# Patient Record
Sex: FEMALE | Race: WHITE | NOT HISPANIC OR LATINO | Employment: OTHER | URBAN - METROPOLITAN AREA
[De-identification: names, ages, dates, MRNs, and addresses within clinical notes are randomized per-mention and may not be internally consistent; named-entity substitution may affect disease eponyms.]

---

## 2024-06-04 ENCOUNTER — OFFICE VISIT (OUTPATIENT)
Dept: CARDIOLOGY | Facility: CLINIC | Age: 74
End: 2024-06-04
Payer: MEDICARE

## 2024-06-04 VITALS — DIASTOLIC BLOOD PRESSURE: 76 MMHG | BODY MASS INDEX: 39.11 KG/M2 | WEIGHT: 207 LBS | SYSTOLIC BLOOD PRESSURE: 142 MMHG

## 2024-06-04 DIAGNOSIS — I10 ESSENTIAL HYPERTENSION: ICD-10-CM

## 2024-06-04 DIAGNOSIS — Z71.89 CARDIAC RISK COUNSELING: Primary | ICD-10-CM

## 2024-06-04 DIAGNOSIS — Z01.810 PREOPERATIVE CARDIOVASCULAR EXAMINATION: ICD-10-CM

## 2024-06-04 PROCEDURE — 99214 OFFICE O/P EST MOD 30 MIN: CPT | Performed by: INTERNAL MEDICINE

## 2024-06-04 PROCEDURE — 1125F AMNT PAIN NOTED PAIN PRSNT: CPT | Performed by: INTERNAL MEDICINE

## 2024-06-04 PROCEDURE — 1036F TOBACCO NON-USER: CPT | Performed by: INTERNAL MEDICINE

## 2024-06-04 PROCEDURE — 1157F ADVNC CARE PLAN IN RCRD: CPT | Performed by: INTERNAL MEDICINE

## 2024-06-04 PROCEDURE — 3078F DIAST BP <80 MM HG: CPT | Performed by: INTERNAL MEDICINE

## 2024-06-04 PROCEDURE — 3077F SYST BP >= 140 MM HG: CPT | Performed by: INTERNAL MEDICINE

## 2024-06-04 PROCEDURE — 1159F MED LIST DOCD IN RCRD: CPT | Performed by: INTERNAL MEDICINE

## 2024-06-04 RX ORDER — ALBUTEROL SULFATE 90 UG/1
2 AEROSOL, METERED RESPIRATORY (INHALATION) EVERY 4 HOURS PRN
COMMUNITY

## 2024-06-04 RX ORDER — AMLODIPINE BESYLATE 2.5 MG/1
2.5 TABLET ORAL DAILY
Qty: 30 TABLET | Refills: 11 | Status: SHIPPED | OUTPATIENT
Start: 2024-06-04 | End: 2025-06-04

## 2024-06-04 RX ORDER — ATORVASTATIN CALCIUM 20 MG/1
20 TABLET, FILM COATED ORAL DAILY
COMMUNITY
Start: 2024-02-23 | End: 2025-02-22

## 2024-06-04 ASSESSMENT — COLUMBIA-SUICIDE SEVERITY RATING SCALE - C-SSRS
1. IN THE PAST MONTH, HAVE YOU WISHED YOU WERE DEAD OR WISHED YOU COULD GO TO SLEEP AND NOT WAKE UP?: NO
6. HAVE YOU EVER DONE ANYTHING, STARTED TO DO ANYTHING, OR PREPARED TO DO ANYTHING TO END YOUR LIFE?: NO
2. HAVE YOU ACTUALLY HAD ANY THOUGHTS OF KILLING YOURSELF?: NO

## 2024-06-04 ASSESSMENT — ENCOUNTER SYMPTOMS
DEPRESSION: 0
OCCASIONAL FEELINGS OF UNSTEADINESS: 0
LOSS OF SENSATION IN FEET: 0

## 2024-06-04 ASSESSMENT — PATIENT HEALTH QUESTIONNAIRE - PHQ9
1. LITTLE INTEREST OR PLEASURE IN DOING THINGS: NOT AT ALL
SUM OF ALL RESPONSES TO PHQ9 QUESTIONS 1 AND 2: 0
2. FEELING DOWN, DEPRESSED OR HOPELESS: NOT AT ALL

## 2024-06-04 ASSESSMENT — PAIN SCALES - GENERAL: PAINLEVEL: 4

## 2024-06-04 NOTE — PROGRESS NOTES
Not applicable Subjective      Chief Complaint   Patient presents with    yearly follow up        Here for her annual visit to assess cardiac risk management, and she remains angina free with a stable functional.    Previous: She had nuclear stress testing in July 2022 nonischemic study with hyperdynamic LV systolic function and left ventricular ejection fraction 80%.         Review of Systems   All other systems reviewed and are negative.       Objective   Physical Exam  Constitutional:       Appearance: Normal appearance.   HENT:      Head: Normocephalic and atraumatic.   Eyes:      Pupils: Pupils are equal, round, and reactive to light.   Cardiovascular:      Rate and Rhythm: Normal rate and regular rhythm.      Pulses: Normal pulses.      Heart sounds: Normal heart sounds.   Pulmonary:      Effort: Pulmonary effort is normal.      Breath sounds: Normal breath sounds.   Abdominal:      General: Abdomen is flat. Bowel sounds are normal.      Palpations: Abdomen is soft.   Musculoskeletal:         General: Normal range of motion.      Cervical back: Normal range of motion.   Skin:     General: Skin is warm and dry.   Neurological:      General: No focal deficit present.   Psychiatric:         Mood and Affect: Mood normal.         Judgment: Judgment normal.          Lab Review:   Not applicable    Cardiac risk counseling  We talked about the value of daily walking/exercise as well as a commitment to low carbohydrate and low sugar [place]/Mediterranean dietary lifestyle for cardiac risk reduction and weight and hypertension management.    She can follow-up with me on an annual basis with lipid and metabolic profiles primary care physician.

## 2024-06-04 NOTE — ASSESSMENT & PLAN NOTE
We talked about the value of daily walking/exercise as well as a commitment to low carbohydrate and low sugar [place]/Mediterranean dietary lifestyle for cardiac risk reduction and weight and hypertension management.    She can proceed with her knee replacement surgery with a low cardiovascular risk based on her history of nonischemic nuclear stress test, and stable cardiac risk factors and follow-up with me in 1 year.    She continues on a moderate dose statin therapy and can follow-up with me in 1 year.    She can follow-up with me on an annual basis with lipid and metabolic profiles primary care physician.

## 2024-06-10 ENCOUNTER — PRE-ADMISSION TESTING (OUTPATIENT)
Dept: PREADMISSION TESTING | Facility: HOSPITAL | Age: 74
End: 2024-06-10
Payer: MEDICARE

## 2024-06-10 ENCOUNTER — LAB (OUTPATIENT)
Dept: LAB | Facility: LAB | Age: 74
End: 2024-06-10
Payer: MEDICARE

## 2024-06-10 VITALS
BODY MASS INDEX: 41 KG/M2 | WEIGHT: 217.15 LBS | HEART RATE: 76 BPM | TEMPERATURE: 97.7 F | DIASTOLIC BLOOD PRESSURE: 95 MMHG | SYSTOLIC BLOOD PRESSURE: 150 MMHG | OXYGEN SATURATION: 100 % | HEIGHT: 61 IN | RESPIRATION RATE: 18 BRPM

## 2024-06-10 DIAGNOSIS — Z01.818 PREOPERATIVE TESTING: Primary | ICD-10-CM

## 2024-06-10 DIAGNOSIS — Z01.818 PREOPERATIVE TESTING: ICD-10-CM

## 2024-06-10 LAB
ALBUMIN SERPL-MCNC: 4.2 G/DL (ref 3.5–5)
ALP BLD-CCNC: 139 U/L (ref 35–125)
ALT SERPL-CCNC: 19 U/L (ref 5–40)
ANION GAP SERPL CALC-SCNC: 18 MMOL/L
APPEARANCE UR: CLEAR
AST SERPL-CCNC: 25 U/L (ref 5–40)
ATRIAL RATE: 72 BPM
BILIRUB SERPL-MCNC: 0.3 MG/DL (ref 0.1–1.2)
BILIRUB UR STRIP.AUTO-MCNC: NEGATIVE MG/DL
BUN SERPL-MCNC: 25 MG/DL (ref 8–25)
CALCIUM SERPL-MCNC: 10 MG/DL (ref 8.5–10.4)
CHLORIDE SERPL-SCNC: 97 MMOL/L (ref 97–107)
CO2 SERPL-SCNC: 21 MMOL/L (ref 24–31)
COLOR UR: NORMAL
CREAT SERPL-MCNC: 1 MG/DL (ref 0.4–1.6)
EGFRCR SERPLBLD CKD-EPI 2021: 60 ML/MIN/1.73M*2
ERYTHROCYTE [DISTWIDTH] IN BLOOD BY AUTOMATED COUNT: 11.6 % (ref 11.5–14.5)
GLUCOSE SERPL-MCNC: 89 MG/DL (ref 65–99)
GLUCOSE UR STRIP.AUTO-MCNC: NORMAL MG/DL
HCT VFR BLD AUTO: 42.6 % (ref 36–46)
HGB BLD-MCNC: 13.6 G/DL (ref 12–16)
HOLD SPECIMEN: NORMAL
KETONES UR STRIP.AUTO-MCNC: NEGATIVE MG/DL
LEUKOCYTE ESTERASE UR QL STRIP.AUTO: NEGATIVE
MCH RBC QN AUTO: 33.4 PG (ref 26–34)
MCHC RBC AUTO-ENTMCNC: 31.9 G/DL (ref 32–36)
MCV RBC AUTO: 105 FL (ref 80–100)
NITRITE UR QL STRIP.AUTO: NEGATIVE
NRBC BLD-RTO: 0 /100 WBCS (ref 0–0)
P AXIS: 86 DEGREES
P OFFSET: 180 MS
P ONSET: 123 MS
PH UR STRIP.AUTO: 7.5 [PH]
PLATELET # BLD AUTO: 309 X10*3/UL (ref 150–450)
POTASSIUM SERPL-SCNC: 4.3 MMOL/L (ref 3.4–5.1)
PR INTERVAL: 182 MS
PROT SERPL-MCNC: 7.7 G/DL (ref 5.9–7.9)
PROT UR STRIP.AUTO-MCNC: NEGATIVE MG/DL
Q ONSET: 214 MS
QRS COUNT: 12 BEATS
QRS DURATION: 86 MS
QT INTERVAL: 400 MS
QTC CALCULATION(BAZETT): 438 MS
QTC FREDERICIA: 425 MS
R AXIS: -81 DEGREES
RBC # BLD AUTO: 4.07 X10*6/UL (ref 4–5.2)
RBC # UR STRIP.AUTO: NEGATIVE /UL
SODIUM SERPL-SCNC: 136 MMOL/L (ref 133–145)
SP GR UR STRIP.AUTO: 1.01
T AXIS: 70 DEGREES
T OFFSET: 414 MS
UROBILINOGEN UR STRIP.AUTO-MCNC: NORMAL MG/DL
VENTRICULAR RATE: 72 BPM
WBC # BLD AUTO: 6.7 X10*3/UL (ref 4.4–11.3)

## 2024-06-10 PROCEDURE — 36415 COLL VENOUS BLD VENIPUNCTURE: CPT

## 2024-06-10 PROCEDURE — 93010 ELECTROCARDIOGRAM REPORT: CPT | Performed by: INTERNAL MEDICINE

## 2024-06-10 PROCEDURE — 81003 URINALYSIS AUTO W/O SCOPE: CPT

## 2024-06-10 PROCEDURE — 85027 COMPLETE CBC AUTOMATED: CPT

## 2024-06-10 PROCEDURE — 93005 ELECTROCARDIOGRAM TRACING: CPT

## 2024-06-10 PROCEDURE — 80053 COMPREHEN METABOLIC PANEL: CPT

## 2024-06-10 PROCEDURE — 87081 CULTURE SCREEN ONLY: CPT | Mod: WESLAB

## 2024-06-10 RX ORDER — TRIAMTERENE AND HYDROCHLOROTHIAZIDE 75; 50 MG/1; MG/1
1 TABLET ORAL
COMMUNITY
Start: 2016-08-18 | End: 2025-02-22

## 2024-06-10 RX ORDER — CHLORHEXIDINE GLUCONATE ORAL RINSE 1.2 MG/ML
SOLUTION DENTAL
Qty: 473 ML | Refills: 0 | Status: SHIPPED | OUTPATIENT
Start: 2024-06-10

## 2024-06-10 RX ORDER — CARBAMAZEPINE 200 MG/1
200-400 TABLET ORAL 2 TIMES DAILY
COMMUNITY

## 2024-06-10 RX ORDER — BUTALBITAL, ACETAMINOPHEN AND CAFFEINE 300; 40; 50 MG/1; MG/1; MG/1
1 CAPSULE ORAL EVERY 4 HOURS PRN
COMMUNITY

## 2024-06-10 ASSESSMENT — DUKE ACTIVITY SCORE INDEX (DASI)
DASI METS SCORE: 8
CAN YOU DO MODERATE WORK AROUND THE HOUSE LIKE VACUUMING, SWEEPING FLOORS OR CARRYING GROCERIES: YES
CAN YOU PARTICIPATE IN STRENOUS SPORTS LIKE SWIMMING, SINGLES TENNIS, FOOTBALL, BASKETBALL, OR SKIING: NO
CAN YOU DO HEAVY WORK AROUND THE HOUSE LIKE SCRUBBING FLOORS OR LIFTING AND MOVING HEAVY FURNITURE: YES
TOTAL_SCORE: 42.7
CAN YOU DO LIGHT WORK AROUND THE HOUSE LIKE DUSTING OR WASHING DISHES: YES
CAN YOU RUN A SHORT DISTANCE: NO
CAN YOU PARTICIPATE IN MODERATE RECREATIONAL ACTIVITIES LIKE GOLF, BOWLING, DANCING, DOUBLES TENNIS OR THROWING A BASEBALL OR FOOTBALL: YES
CAN YOU TAKE CARE OF YOURSELF (EAT, DRESS, BATHE, OR USE TOILET): YES
CAN YOU CLIMB A FLIGHT OF STAIRS OR WALK UP A HILL: YES
CAN YOU HAVE SEXUAL RELATIONS: YES
CAN YOU DO YARD WORK LIKE RAKING LEAVES, WEEDING OR PUSHING A MOWER: YES
CAN YOU WALK INDOORS, SUCH AS AROUND YOUR HOUSE: YES
CAN YOU WALK A BLOCK OR TWO ON LEVEL GROUND: YES

## 2024-06-10 ASSESSMENT — PAIN SCALES - GENERAL: PAINLEVEL_OUTOF10: 5 - MODERATE PAIN

## 2024-06-10 ASSESSMENT — PAIN - FUNCTIONAL ASSESSMENT: PAIN_FUNCTIONAL_ASSESSMENT: 0-10

## 2024-06-10 ASSESSMENT — ENCOUNTER SYMPTOMS
CARDIOVASCULAR NEGATIVE: 1
EYES NEGATIVE: 1
PSYCHIATRIC NEGATIVE: 1
ARTHRALGIAS: 1
GASTROINTESTINAL NEGATIVE: 1
COUGH: 1
ACTIVITY CHANGE: 1

## 2024-06-10 ASSESSMENT — PAIN DESCRIPTION - DESCRIPTORS: DESCRIPTORS: ACHING

## 2024-06-10 NOTE — H&P (VIEW-ONLY)
"CPM/PAT Evaluation       Name: Peggy Menjivar (Peggy Menjivar)  /Age: 1950/73 y.o.     In-Person       Chief Complaint: Primary osteoarthritis of left knee    HPI  A 73-year-old female with left knee osteoarthritis. History of progressive left knee pain for \"years and years\".  Symptoms increase with prolonged ambulation, transitioning from sitting to standing and at bedtime interfering with sleep and quality of life.  Conservative treatments/injections no longer helping.  She underwent successful right total knee arthroplasty 2023. Denies fever, chills, chest pain, shortness of breath, syncope, or left lower extremity numbness.  She is scheduled for left total knee arthroplasty resurfacing.    Past Medical History:   Diagnosis Date    Adverse effect of anesthesia     \"Chills\" and \"I have alot of mucus\"    Breast cancer (Multi)     left  breast    HLD (hyperlipidemia)     HTN (hypertension)     Osteoarthritis     Seizure (Multi)     last     Spinal stenosis        Past Surgical History:   Procedure Laterality Date    BREAST SURGERY Left     breast cancer,  lumpectomy/mastectomy/reconstruction    CHOLECYSTECTOMY      COLONOSCOPY      HYSTERECTOMY      TOTAL KNEE ARTHROPLASTY Right 2023         No Known Allergies    Current Outpatient Medications   Medication Sig Dispense Refill    acetaminophen (Tylenol) 500 mg tablet TAKE 1 TABLET BY MOUTH EVERY 4 HOURS 180 tablet 0    albuterol 90 mcg/actuation inhaler Inhale 2 puffs every 4 hours if needed for wheezing.      amLODIPine (Norvasc) 2.5 mg tablet Take 1 tablet (2.5 mg) by mouth once daily. (Patient taking differently: Take 1 tablet (2.5 mg) by mouth if needed (IF BP >140).) 30 tablet 11    atorvastatin (Lipitor) 20 mg tablet Take 1 tablet (20 mg) by mouth once daily.      butalbital-acetaminophen-caff (Fioricet) -40 mg capsule Take 1 capsule by mouth every 4 hours if needed for headaches.      carBAMazepine (TEGretol) 200 mg tablet " Take 1-2 tablets (200-400 mg) by mouth 2 times a day. Take 2 tablets in the morning and 1 tablet in the evening.      MELOXICAM ORAL Take 15 mg by mouth once daily.      triamterene-hydrochlorothiazid (Maxzide) 75-50 mg tablet Take 1 tablet by mouth once daily.      chlorhexidine (Peridex) 0.12 % solution Use cap to measure 15 mL.  Swish/gargle mouthwash for at least 30 seconds.  Do not swallow.  Use night before surgery after brushing teeth and morning of surgery after brushing teeth. 473 mL 0     No current facility-administered medications for this visit.     Review of Systems   Constitutional:  Positive for activity change.   HENT:  Positive for postnasal drip (Chronic).    Eyes: Negative.         Contacts/glasses   Respiratory:  Positive for cough (Chronic).    Cardiovascular: Negative.    Gastrointestinal: Negative.    Genitourinary: Negative.    Musculoskeletal:  Positive for arthralgias and gait problem.        Left knee pain   Skin: Negative.    Psychiatric/Behavioral: Negative.          Physical Exam  Vitals (Elevated BP-patient advised to monitor BP, take medications as prescribed and follow-up with PCP) reviewed.   Constitutional:       Appearance: She is obese.   HENT:      Head: Normocephalic and atraumatic.      Mouth/Throat:      Mouth: Mucous membranes are moist.   Eyes:      Pupils: Pupils are equal, round, and reactive to light.      Comments: Contact lenses   Cardiovascular:      Rate and Rhythm: Normal rate and regular rhythm.   Pulmonary:      Effort: Pulmonary effort is normal.      Breath sounds: Normal breath sounds.   Abdominal:      Palpations: Abdomen is soft.   Musculoskeletal:      Cervical back: Normal range of motion.      Comments: Left knee pain, antalgic gait   Skin:     General: Skin is warm and dry.   Neurological:      Mental Status: She is oriented to person, place, and time.   Psychiatric:         Mood and Affect: Mood normal.          PAT AIRWAY:   Airway:     Mallampati::   "II    Neck ROM::  Full  normal        BP (!) 150/95   Pulse 76   Temp 36.5 °C (97.7 °F) (Tympanic)   Resp 18   Ht 1.549 m (5' 1\")   Wt 98.5 kg (217 lb 2.5 oz)   SpO2 100%   BMI 41.03 kg/m²       Stop Bang Score 4   CHADS 2 score: 2.8%  DASI score: 42.7  METS score: 8  Revised cardiac risk index: 0.9%  ASA III  ARISCAT 1.6%  Lab orders CBC, CMP, UA, MRSA, EKG  Cardiac stress test 7/25/2022 WNL, echocardiogram LVEF 80%  EKG at PAT normal sinus rhythm, left axis deviation, pulmonary disease pattern preliminary awaiting confirmation by cardiologist  Assessment and Plan:     Primary osteoarthritis of left knee Plan: Left total knee arthroplasty resurfacing.  Hypertension-follows yearly with Dr. Fields last visit 6/4/2024  Hyperlipidemia  History of seizure last 1985  History of left breast cancer status post left breast surgery/mastectomy/chemo and reconstruction  Migraine  Osteoarthritis/spinal stenosis  BMI 41.03          "

## 2024-06-10 NOTE — PREPROCEDURE INSTRUCTIONS
Medication List            Accurate as of Cass 10, 2024 10:28 AM. Always use your most recent med list.                acetaminophen 500 mg tablet  Commonly known as: Tylenol  TAKE 1 TABLET BY MOUTH EVERY 4 HOURS  Medication Adjustments for Surgery: Other (Comment)  Notes to patient: CAN TAKE THE MORNING OF SURGERY IF NEEDED     albuterol 90 mcg/actuation inhaler  Medication Adjustments for Surgery: Other (Comment)  Notes to patient: CAN USE THE MORNING OF SURGERY IF NEEDED - BRING WITH YOU TO SURGERY     amLODIPine 2.5 mg tablet  Commonly known as: Norvasc  Take 1 tablet (2.5 mg) by mouth as needed if BP >140  Medication Adjustments for Surgery: Take morning of surgery with sip of water, no other fluids     atorvastatin 20 mg tablet  Commonly known as: Lipitor  Medication Adjustments for Surgery: Other (Comment)  Notes to patient: CAN TAKE THE NIGHT BEFORE SURGERY     carBAMazepine 200 mg tablet  Commonly known as: TEGretol  Medication Adjustments for Surgery: Take morning of surgery with sip of water, no other fluids     chlorhexidine 0.12 % solution  Commonly known as: Peridex  Use cap to measure 15 mL.  Swish/gargle mouthwash for at least 30 seconds.  Do not swallow.  Use night before surgery after brushing teeth and morning of surgery after brushing teeth.  Medication Adjustments for Surgery: Other (Comment)  Notes to patient: USE THE NIGHT BEFORE SURGERY AND THE MORNING OF SURGERY     Fioricet -40 mg capsule  Generic drug: butalbital-acetaminophen-caff  Medication Adjustments for Surgery: Other (Comment)  Notes to patient: CAN TAKE THE MORNING OF SURGERY IF NEEDED     MELOXICAM ORAL  Medication Adjustments for Surgery: Stop 7 days before surgery     triamterene-hydrochlorothiazid 75-50 mg tablet  Commonly known as: Maxzide  Medication Adjustments for Surgery: Take morning of surgery with sip of water, no other fluids                 Preoperative Fasting Guidelines    Why must I stop eating and  drinking near surgery time?  With sedation, food or liquid in your stomach can enter your lungs causing serious complications  Increases nausea and vomiting    When do I need to stop eating and drinking before my surgery?  Do not eat any food or drink any liquids after midnight the night before your surgery/procedure.  You may have sips of water to take medications.    PAT DISCHARGE INSTRUCTIONS    Please call the Same Day Surgery (SDS) Department of the hospital where your procedure will be performed after 2:00 PM the day before your surgery. If you are scheduled on a Monday, or a Tuesday following a Monday holiday, you will need to call on the last business day prior to your surgery.    [facility]  [address] [address], [address] [phone]  [facility]  [address] [place] [address] [phone]  [facility]  [address] [facility].  [place] [id number] [phone]    Please let your surgeon know if:      You develop any open sores, shingles, burning or painful urination as these may increase your risk of an infection.   You no longer wish to have the surgery.   Any other personal circumstances change that may lead to the need to cancel or defer this surgery-such as being sick or getting admitted to any hospital within one week of your planned procedure.    Your contact details change, such as a change of address or phone number.    Starting now:     Please DO NOT drink alcohol or smoke for 24 hours before surgery. It is well known that quitting smoking can make a huge difference to your health and recovery from surgery. The longer you abstain from smoking, the better your chances of a healthy recovery. If you need help with quitting, call 8-800-QUIT-NOW to be connected to a trained counselor who will discuss the best methods to help you quit.     Before your  surgery:    Please stop all supplements 7 days prior to surgery. Or as directed by your surgeon.   Please stop taking NSAID pain medicine such as Advil and Motrin 7 days before surgery.    If you develop any fever, cough, cold, rashes, cuts, scratches, scrapes, urinary symptoms or infection anywhere on your body (including teeth and gums) prior to surgery, please call your surgeon’s office as soon as possible. This may require treatment to reduce the chance of cancellation on the day of surgery.    The day before your surgery:   DIET- Please follow the diet instructions at the top of your packet.   Get a good night’s rest.  Use the special soap for bathing if you have been instructed to use one.    Scheduled surgery times may change and you will be notified if this occurs - please check your personal voicemail for any updates.     On the morning of surgery:   Wear comfortable, loose fitting clothes which open in the front. Please do not wear moisturizers, creams, lotions, makeup or perfume.    Please bring with you to surgery:   Photo ID and insurance card   Current list of medicines and allergies   Pacemaker/ Defibrillator/Heart stent cards   CPAP machine and mask    Slings/ splints/ crutches   A copy of your complete advanced directive/DHPOA.    Please do NOT bring with you to surgery:   All jewelry and valuables should be left at home.   Prosthetic devices such as contact lenses, hearing aids, dentures, eyelash extensions, hairpins and body piercings must be removed prior to going in to the surgical suite.    After outpatient surgery:   A responsible adult MUST accompany you at the time of discharge and stay with you for 24 hours after your surgery. You may NOT drive yourself home after surgery.    Do not drive, operate machinery, make critical decisions or do activities that require co-ordination or balance until after a night’s sleep.   Do not drink alcoholic beverages for 24 hours.   Instructions for resuming  your medications will be provided by your surgeon.    CALL YOUR DOCTOR AFTER SURGERY IF YOU HAVE:     Chills and/or a fever of 101° F or higher.    Redness, swelling, pus or drainage from your surgical wound or a bad smell from the wound.    Lightheadedness, fainting or confusion.    Persistent vomiting (throwing up) and are not able to eat or drink for 12 hours.    Three or more loose, watery bowel movements in 24 hours (diarrhea).   Difficulty or pain while urinating( after non-urological surgery)    Pain and swelling in your legs, especially if it is only on one side.    Difficulty breathing or are breathing faster than normal.    Any new concerning symptoms.      Patient Information: Pre-Operative Infection Prevention Measures     Why did I have my nose, under my arms, and groin swabbed?  The purpose of the swab is to identify Staphylococcus aureus inside your nose or on your skin.  The swab was sent to the laboratory for culture.  A positive swab/culture for Staphylococcus aureus is called colonization or carriage.      What is Staphylococcus aureus?  Staphylococcus aureus, also known as “staph”, is a germ found on the skin or in the nose of healthy people.  Sometimes Staphylococcus aureus can get into the body and cause an infection.  This can be minor (such as pimples, boils, or other skin problems).  It might also be serious (such as a blood infection, pneumonia, or a surgical site infection).    What is Staphylococcus aureus colonization or carriage?  Colonization or carriage means that a person has the germ but is not sick from it.  These bacteria can be spread on the hands or when breathing or sneezing.    How is Staphylococcus aureus spread?  It is most often spread by close contact with a person or item that carries it.    What happens if my culture is positive for Staphylococcus aureus?  Your doctor/medical team will use this information to guide any antibiotic treatment which may be necessary.   Regardless of the culture results, we will clean the inside of your nose with a betadine swab just before you have your surgery.      Will I get an infection if I have Staphylococcus aureus in my nose or on my skin?  Anyone can get an infection with Staphylococcus aureus.  However, the best way to reduce your risk of infection is to follow the instructions provided to you for the use of your CHG soap and dental rinse.        Patient Information: Oral/Dental Rinse    What is oral/dental rinse?   It is a mouthwash. It is a way of cleaning the mouth with a germ-killing solution before your surgery.  The solution contains chlorhexidine, commonly known as CHG.   It is used inside the mouth to kill a bacteria known as Staphylococcus aureus.  Let your doctor know if you are allergic to Chlorhexidine.    Why do I need to use CHG oral/dental rinse?  The CHG oral/dental rinse helps to kill a bacteria in your mouth known as Staphylococcus aureus.     This reduces the risk of infection at the surgical site.      Using your CHG oral/dental rinse  STEPS:  Use your CHG oral/dental rinse after you brush your teeth the night before (at bedtime) and the morning of your surgery.  Follow all directions on your prescription label.    Use the cap on the container to measure 15ml   Swish (gargle if you can) the mouthwash in your mouth for at least 30 seconds, (do not swallow) and spit out  After you use your CHG rinse, do not rinse your mouth with water, drink or eat.  Please refer to the prescription label for the appropriate time to resume oral intake      What side effects might I have using the CHG oral/dental rinse?  CHG rinse will stick to plaque on the teeth.  Brush and floss just before use.  Teeth brushing will help avoid staining of plaque during use.      Patient Information: Home Preoperative Antibacterial Shower      What is a home preoperative antibacterial shower?  This shower is a way of cleaning the skin with a  germ-killing solution before surgery.  The solution contains chlorhexidine, commonly known as CHG.  CHG is a skin cleanser with germ-killing ability.  Let your doctor know if you are allergic to chlorhexidine.    Why do I need to take a preoperative antibacterial shower?  Skin is not sterile.  It is best to try to make your skin as free of germs as possible before surgery.  Proper cleansing with a germ-killing soap before surgery can lower the number of germs on your skin.  This helps to reduce the risk of infection at the surgical site.  Following the instructions listed below will help you prepare your skin for surgery.      How do I use the solution?  Steps:  Begin using your CHG soap 5 days before your scheduled surgery on _____start wash 6/20/24________________.    First, wash and rinse your hair using the CHG soap. Keep CHG soap away from ear canals and eyes.  Rinse completely, do not condition.  Hair extensions should be removed.  Wash your face with your normal soap and rinse.    Apply the CHG solution to a clean wet washcloth.  Turn the water off or move away from the water spray to avoid premature rinsing of the CHG soap as you are applying.   Firmly lather your entire body from the neck down.  Do not use on your face.  Pay special attention to the area(s) where your incision(s) will be located unless they are on your face.  Avoid scrubbing your skin too hard.  The important point is to have the CHG soap sit on your skin for 3 minutes.    When the 3 minutes are up, turn on the water and rinse the CHG solution off your body completely.   DO NOT wash with regular soap after you have used the CHG soap solution  Pat yourself dry with a clean, freshly-laundered towel.  DO NOT apply powders, deodorants, or lotions.  Dress in clean, freshly laundered nightclothes.    Be sure to sleep with clean, freshly laundered sheets.  Be aware that CHG will cause stains on fabrics; if you wash them with bleach after use.   Rinse your washcloth and other linens that have contact with CHG completely.  Use only non-chlorine detergents to launder the items used.   The morning of surgery is the fifth day.  Repeat the above steps and dress in clean comfortable clothing     Whom should I contact if I have any questions regarding the use of CHG soap?  Call the [facility] at [phone] if you have any questions.

## 2024-06-12 LAB — STAPHYLOCOCCUS SPEC CULT: NORMAL

## 2024-06-24 ENCOUNTER — HOSPITAL ENCOUNTER (OUTPATIENT)
Facility: HOSPITAL | Age: 74
Discharge: HOME | End: 2024-06-26
Attending: ORTHOPAEDIC SURGERY | Admitting: ORTHOPAEDIC SURGERY
Payer: MEDICARE

## 2024-06-24 ENCOUNTER — ANESTHESIA EVENT (OUTPATIENT)
Dept: OPERATING ROOM | Facility: HOSPITAL | Age: 74
End: 2024-06-24
Payer: MEDICARE

## 2024-06-24 ENCOUNTER — ANESTHESIA (OUTPATIENT)
Dept: OPERATING ROOM | Facility: HOSPITAL | Age: 74
End: 2024-06-24
Payer: MEDICARE

## 2024-06-24 ENCOUNTER — APPOINTMENT (OUTPATIENT)
Dept: RADIOLOGY | Facility: HOSPITAL | Age: 74
End: 2024-06-24
Payer: MEDICARE

## 2024-06-24 DIAGNOSIS — M17.12 ARTHRITIS OF LEFT KNEE: Primary | ICD-10-CM

## 2024-06-24 PROBLEM — E66.813 CLASS 3 SEVERE OBESITY IN ADULT: Status: ACTIVE | Noted: 2024-06-24

## 2024-06-24 PROBLEM — R56.9 SEIZURES (MULTI): Status: ACTIVE | Noted: 2024-06-24

## 2024-06-24 PROBLEM — E66.01 CLASS 3 SEVERE OBESITY IN ADULT (MULTI): Status: ACTIVE | Noted: 2024-06-24

## 2024-06-24 PROBLEM — I10 HTN (HYPERTENSION): Status: ACTIVE | Noted: 2024-06-24

## 2024-06-24 PROBLEM — E78.5 HYPERLIPIDEMIA: Status: ACTIVE | Noted: 2024-06-24

## 2024-06-24 PROCEDURE — 3600000005 HC OR TIME - INITIAL BASE CHARGE - PROCEDURE LEVEL FIVE: Performed by: ORTHOPAEDIC SURGERY

## 2024-06-24 PROCEDURE — 7100000001 HC RECOVERY ROOM TIME - INITIAL BASE CHARGE: Performed by: ORTHOPAEDIC SURGERY

## 2024-06-24 PROCEDURE — 3700000001 HC GENERAL ANESTHESIA TIME - INITIAL BASE CHARGE: Performed by: ORTHOPAEDIC SURGERY

## 2024-06-24 PROCEDURE — 7100000011 HC EXTENDED STAY RECOVERY HOURLY - NURSING UNIT

## 2024-06-24 PROCEDURE — 2500000005 HC RX 250 GENERAL PHARMACY W/O HCPCS: Performed by: STUDENT IN AN ORGANIZED HEALTH CARE EDUCATION/TRAINING PROGRAM

## 2024-06-24 PROCEDURE — 97530 THERAPEUTIC ACTIVITIES: CPT | Mod: GP | Performed by: PHYSICAL THERAPIST

## 2024-06-24 PROCEDURE — A4649 SURGICAL SUPPLIES: HCPCS | Performed by: ORTHOPAEDIC SURGERY

## 2024-06-24 PROCEDURE — 97161 PT EVAL LOW COMPLEX 20 MIN: CPT | Mod: GP | Performed by: PHYSICAL THERAPIST

## 2024-06-24 PROCEDURE — 2500000005 HC RX 250 GENERAL PHARMACY W/O HCPCS: Performed by: ORTHOPAEDIC SURGERY

## 2024-06-24 PROCEDURE — 2500000004 HC RX 250 GENERAL PHARMACY W/ HCPCS (ALT 636 FOR OP/ED): Performed by: NURSE ANESTHETIST, CERTIFIED REGISTERED

## 2024-06-24 PROCEDURE — 73560 X-RAY EXAM OF KNEE 1 OR 2: CPT | Mod: LEFT SIDE | Performed by: RADIOLOGY

## 2024-06-24 PROCEDURE — 64447 NJX AA&/STRD FEMORAL NRV IMG: CPT | Performed by: STUDENT IN AN ORGANIZED HEALTH CARE EDUCATION/TRAINING PROGRAM

## 2024-06-24 PROCEDURE — 2500000004 HC RX 250 GENERAL PHARMACY W/ HCPCS (ALT 636 FOR OP/ED): Mod: JZ | Performed by: ORTHOPAEDIC SURGERY

## 2024-06-24 PROCEDURE — 2720000007 HC OR 272 NO HCPCS: Mod: MUE | Performed by: ORTHOPAEDIC SURGERY

## 2024-06-24 PROCEDURE — 2500000001 HC RX 250 WO HCPCS SELF ADMINISTERED DRUGS (ALT 637 FOR MEDICARE OP): Performed by: ORTHOPAEDIC SURGERY

## 2024-06-24 PROCEDURE — 3700000002 HC GENERAL ANESTHESIA TIME - EACH INCREMENTAL 1 MINUTE: Performed by: ORTHOPAEDIC SURGERY

## 2024-06-24 PROCEDURE — 99100 ANES PT EXTEME AGE<1 YR&>70: CPT | Performed by: STUDENT IN AN ORGANIZED HEALTH CARE EDUCATION/TRAINING PROGRAM

## 2024-06-24 PROCEDURE — 9420000001 HC RT PATIENT EDUCATION 5 MIN

## 2024-06-24 PROCEDURE — 2780000003 HC OR 278 NO HCPCS: Performed by: ORTHOPAEDIC SURGERY

## 2024-06-24 PROCEDURE — A9999 DME SUPPLY OR ACCESSORY, NOS: HCPCS | Mod: MUE | Performed by: ORTHOPAEDIC SURGERY

## 2024-06-24 PROCEDURE — A27447 PR TOTAL KNEE ARTHROPLASTY: Performed by: STUDENT IN AN ORGANIZED HEALTH CARE EDUCATION/TRAINING PROGRAM

## 2024-06-24 PROCEDURE — 64999 UNLISTED PX NERVOUS SYSTEM: CPT | Performed by: STUDENT IN AN ORGANIZED HEALTH CARE EDUCATION/TRAINING PROGRAM

## 2024-06-24 PROCEDURE — 2500000005 HC RX 250 GENERAL PHARMACY W/O HCPCS: Performed by: NURSE ANESTHETIST, CERTIFIED REGISTERED

## 2024-06-24 PROCEDURE — 2500000001 HC RX 250 WO HCPCS SELF ADMINISTERED DRUGS (ALT 637 FOR MEDICARE OP): Performed by: INTERNAL MEDICINE

## 2024-06-24 PROCEDURE — 3600000010 HC OR TIME - EACH INCREMENTAL 1 MINUTE - PROCEDURE LEVEL FIVE: Performed by: ORTHOPAEDIC SURGERY

## 2024-06-24 PROCEDURE — 97165 OT EVAL LOW COMPLEX 30 MIN: CPT | Mod: GO

## 2024-06-24 PROCEDURE — 7100000002 HC RECOVERY ROOM TIME - EACH INCREMENTAL 1 MINUTE: Performed by: ORTHOPAEDIC SURGERY

## 2024-06-24 PROCEDURE — 97530 THERAPEUTIC ACTIVITIES: CPT | Mod: GO

## 2024-06-24 PROCEDURE — 73560 X-RAY EXAM OF KNEE 1 OR 2: CPT | Mod: LT

## 2024-06-24 PROCEDURE — C1713 ANCHOR/SCREW BN/BN,TIS/BN: HCPCS | Performed by: ORTHOPAEDIC SURGERY

## 2024-06-24 PROCEDURE — C1776 JOINT DEVICE (IMPLANTABLE): HCPCS | Performed by: ORTHOPAEDIC SURGERY

## 2024-06-24 PROCEDURE — 2500000004 HC RX 250 GENERAL PHARMACY W/ HCPCS (ALT 636 FOR OP/ED): Performed by: STUDENT IN AN ORGANIZED HEALTH CARE EDUCATION/TRAINING PROGRAM

## 2024-06-24 PROCEDURE — A27447 PR TOTAL KNEE ARTHROPLASTY: Performed by: NURSE ANESTHETIST, CERTIFIED REGISTERED

## 2024-06-24 DEVICE — PRIMARY TIBIAL BASEPLATE
Type: IMPLANTABLE DEVICE | Site: KNEE | Status: FUNCTIONAL
Brand: TRIATHLON

## 2024-06-24 DEVICE — TIBIAL BEARING INSERT
Type: IMPLANTABLE DEVICE | Site: KNEE | Status: FUNCTIONAL
Brand: TRIATHLON

## 2024-06-24 DEVICE — PATELLA
Type: IMPLANTABLE DEVICE | Site: KNEE | Status: FUNCTIONAL
Brand: TRIATHLON

## 2024-06-24 DEVICE — POSTERIOR STABILIZED FEMORAL
Type: IMPLANTABLE DEVICE | Site: KNEE | Status: FUNCTIONAL
Brand: TRIATHLON

## 2024-06-24 DEVICE — SIMPLEX® HV IS A FAST-SETTING ACRYLIC RESIN FOR USE IN BONE SURGERY. MIXING THE TWO SEPARATE STERILE COMPONENTS PRODUCES A DUCTILE BONE CEMENT WHICH, AFTER HARDENING, FIXES THE IMPLANT AND TRANSFERS STRESSES PRODUCED DURING MOVEMENT EVENLY TO THE BONE. SIMPLEX® HV CEMENT POWDER ALSO CONTAINS INSOLUBLE ZIRCONIUM DIOXIDE AS AN X-RAY CONTRAST MEDIUM. SIMPLEX® HV DOES NOT EMIT A SIGNAL AND DOES NOT POSE A SAFETY RISK IN A MAGNETIC RESONANCE ENVIRONMENT.
Type: IMPLANTABLE DEVICE | Site: KNEE | Status: FUNCTIONAL
Brand: SIMPLEX HV

## 2024-06-24 RX ORDER — PROPOFOL 10 MG/ML
INJECTION, EMULSION INTRAVENOUS AS NEEDED
Status: DISCONTINUED | OUTPATIENT
Start: 2024-06-24 | End: 2024-06-24

## 2024-06-24 RX ORDER — LIDOCAINE HYDROCHLORIDE 10 MG/ML
INJECTION INFILTRATION; PERINEURAL AS NEEDED
Status: DISCONTINUED | OUTPATIENT
Start: 2024-06-24 | End: 2024-06-24

## 2024-06-24 RX ORDER — PREGABALIN 75 MG/1
75 CAPSULE ORAL ONCE
Status: COMPLETED | OUTPATIENT
Start: 2024-06-24 | End: 2024-06-24

## 2024-06-24 RX ORDER — OXYCODONE HCL 10 MG/1
20 TABLET, FILM COATED, EXTENDED RELEASE ORAL ONCE
Status: COMPLETED | OUTPATIENT
Start: 2024-06-24 | End: 2024-06-24

## 2024-06-24 RX ORDER — CELECOXIB 200 MG/1
400 CAPSULE ORAL ONCE
Status: COMPLETED | OUTPATIENT
Start: 2024-06-24 | End: 2024-06-24

## 2024-06-24 RX ORDER — AMLODIPINE BESYLATE 2.5 MG/1
2.5 TABLET ORAL DAILY
Status: DISCONTINUED | OUTPATIENT
Start: 2024-06-24 | End: 2024-06-26 | Stop reason: HOSPADM

## 2024-06-24 RX ORDER — CEFAZOLIN SODIUM 2 G/100ML
2 INJECTION, SOLUTION INTRAVENOUS ONCE
Status: COMPLETED | OUTPATIENT
Start: 2024-06-24 | End: 2024-06-24

## 2024-06-24 RX ORDER — ACETAMINOPHEN 500 MG
1000 TABLET ORAL EVERY 6 HOURS
Status: DISCONTINUED | OUTPATIENT
Start: 2024-06-24 | End: 2024-06-26 | Stop reason: HOSPADM

## 2024-06-24 RX ORDER — ONDANSETRON HYDROCHLORIDE 2 MG/ML
INJECTION, SOLUTION INTRAVENOUS AS NEEDED
Status: DISCONTINUED | OUTPATIENT
Start: 2024-06-24 | End: 2024-06-24

## 2024-06-24 RX ORDER — FENTANYL CITRATE 50 UG/ML
50 INJECTION, SOLUTION INTRAMUSCULAR; INTRAVENOUS EVERY 5 MIN PRN
Status: DISCONTINUED | OUTPATIENT
Start: 2024-06-24 | End: 2024-06-24 | Stop reason: HOSPADM

## 2024-06-24 RX ORDER — CARBAMAZEPINE 200 MG/1
200 TABLET ORAL NIGHTLY
Status: DISCONTINUED | OUTPATIENT
Start: 2024-06-24 | End: 2024-06-26 | Stop reason: HOSPADM

## 2024-06-24 RX ORDER — SODIUM CHLORIDE, SODIUM LACTATE, POTASSIUM CHLORIDE, CALCIUM CHLORIDE 600; 310; 30; 20 MG/100ML; MG/100ML; MG/100ML; MG/100ML
125 INJECTION, SOLUTION INTRAVENOUS CONTINUOUS
Status: ACTIVE | OUTPATIENT
Start: 2024-06-24 | End: 2024-06-25

## 2024-06-24 RX ORDER — BISMUTH SUBSALICYLATE 262 MG
1 TABLET,CHEWABLE ORAL DAILY
Status: DISCONTINUED | OUTPATIENT
Start: 2024-06-24 | End: 2024-06-26 | Stop reason: HOSPADM

## 2024-06-24 RX ORDER — ACETAMINOPHEN 325 MG/1
650 TABLET ORAL ONCE
Status: COMPLETED | OUTPATIENT
Start: 2024-06-24 | End: 2024-06-24

## 2024-06-24 RX ORDER — KETOROLAC TROMETHAMINE 30 MG/ML
15 INJECTION, SOLUTION INTRAMUSCULAR; INTRAVENOUS EVERY 6 HOURS PRN
Status: DISCONTINUED | OUTPATIENT
Start: 2024-06-24 | End: 2024-06-26 | Stop reason: HOSPADM

## 2024-06-24 RX ORDER — CARBAMAZEPINE 200 MG/1
400 TABLET ORAL DAILY
Status: DISCONTINUED | OUTPATIENT
Start: 2024-06-25 | End: 2024-06-26 | Stop reason: HOSPADM

## 2024-06-24 RX ORDER — OXYCODONE HYDROCHLORIDE 5 MG/1
5 TABLET ORAL EVERY 4 HOURS PRN
Status: DISCONTINUED | OUTPATIENT
Start: 2024-06-24 | End: 2024-06-26 | Stop reason: HOSPADM

## 2024-06-24 RX ORDER — FENTANYL CITRATE 50 UG/ML
50 INJECTION, SOLUTION INTRAMUSCULAR; INTRAVENOUS ONCE
Status: COMPLETED | OUTPATIENT
Start: 2024-06-24 | End: 2024-06-24

## 2024-06-24 RX ORDER — MORPHINE SULFATE 2 MG/ML
2 INJECTION, SOLUTION INTRAMUSCULAR; INTRAVENOUS EVERY 2 HOUR PRN
Status: DISCONTINUED | OUTPATIENT
Start: 2024-06-24 | End: 2024-06-26 | Stop reason: HOSPADM

## 2024-06-24 RX ORDER — POLYETHYLENE GLYCOL 3350 17 G/17G
17 POWDER, FOR SOLUTION ORAL DAILY
Status: DISCONTINUED | OUTPATIENT
Start: 2024-06-24 | End: 2024-06-26 | Stop reason: HOSPADM

## 2024-06-24 RX ORDER — ASPIRIN 81 MG/1
81 TABLET ORAL 2 TIMES DAILY
Status: DISCONTINUED | OUTPATIENT
Start: 2024-06-24 | End: 2024-06-26 | Stop reason: HOSPADM

## 2024-06-24 RX ORDER — DOCUSATE SODIUM 100 MG/1
100 CAPSULE, LIQUID FILLED ORAL 2 TIMES DAILY
Status: DISCONTINUED | OUTPATIENT
Start: 2024-06-24 | End: 2024-06-26 | Stop reason: HOSPADM

## 2024-06-24 RX ORDER — MIDAZOLAM HYDROCHLORIDE 1 MG/ML
2 INJECTION, SOLUTION INTRAMUSCULAR; INTRAVENOUS ONCE
Status: COMPLETED | OUTPATIENT
Start: 2024-06-24 | End: 2024-06-24

## 2024-06-24 RX ORDER — HYDROMORPHONE HYDROCHLORIDE 0.2 MG/ML
0.2 INJECTION INTRAMUSCULAR; INTRAVENOUS; SUBCUTANEOUS EVERY 5 MIN PRN
Status: DISCONTINUED | OUTPATIENT
Start: 2024-06-24 | End: 2024-06-24 | Stop reason: HOSPADM

## 2024-06-24 RX ORDER — ONDANSETRON HYDROCHLORIDE 2 MG/ML
4 INJECTION, SOLUTION INTRAVENOUS ONCE AS NEEDED
Status: DISCONTINUED | OUTPATIENT
Start: 2024-06-24 | End: 2024-06-24 | Stop reason: HOSPADM

## 2024-06-24 RX ORDER — TRANEXAMIC ACID 100 MG/ML
INJECTION, SOLUTION INTRAVENOUS AS NEEDED
Status: DISCONTINUED | OUTPATIENT
Start: 2024-06-24 | End: 2024-06-24

## 2024-06-24 RX ORDER — FENTANYL CITRATE 50 UG/ML
INJECTION, SOLUTION INTRAMUSCULAR; INTRAVENOUS AS NEEDED
Status: DISCONTINUED | OUTPATIENT
Start: 2024-06-24 | End: 2024-06-24

## 2024-06-24 RX ORDER — ALBUTEROL SULFATE 0.83 MG/ML
2.5 SOLUTION RESPIRATORY (INHALATION) EVERY 4 HOURS PRN
Status: DISCONTINUED | OUTPATIENT
Start: 2024-06-24 | End: 2024-06-26 | Stop reason: HOSPADM

## 2024-06-24 RX ORDER — ATORVASTATIN CALCIUM 20 MG/1
20 TABLET, FILM COATED ORAL NIGHTLY
Status: DISCONTINUED | OUTPATIENT
Start: 2024-06-24 | End: 2024-06-26 | Stop reason: HOSPADM

## 2024-06-24 RX ORDER — DEXAMETHASONE SODIUM PHOSPHATE 100 MG/10ML
INJECTION INTRAMUSCULAR; INTRAVENOUS AS NEEDED
Status: DISCONTINUED | OUTPATIENT
Start: 2024-06-24 | End: 2024-06-24

## 2024-06-24 RX ORDER — SODIUM CHLORIDE, SODIUM LACTATE, POTASSIUM CHLORIDE, CALCIUM CHLORIDE 600; 310; 30; 20 MG/100ML; MG/100ML; MG/100ML; MG/100ML
100 INJECTION, SOLUTION INTRAVENOUS CONTINUOUS
Status: DISCONTINUED | OUTPATIENT
Start: 2024-06-24 | End: 2024-06-24

## 2024-06-24 RX ORDER — IPRATROPIUM BROMIDE 0.5 MG/2.5ML
500 SOLUTION RESPIRATORY (INHALATION) EVERY 30 MIN PRN
Status: DISCONTINUED | OUTPATIENT
Start: 2024-06-24 | End: 2024-06-24 | Stop reason: HOSPADM

## 2024-06-24 RX ORDER — CEFAZOLIN SODIUM 2 G/100ML
2 INJECTION, SOLUTION INTRAVENOUS EVERY 8 HOURS
Status: COMPLETED | OUTPATIENT
Start: 2024-06-24 | End: 2024-06-25

## 2024-06-24 RX ORDER — LABETALOL HYDROCHLORIDE 5 MG/ML
5 INJECTION, SOLUTION INTRAVENOUS EVERY 5 MIN PRN
Status: DISCONTINUED | OUTPATIENT
Start: 2024-06-24 | End: 2024-06-24 | Stop reason: HOSPADM

## 2024-06-24 RX ORDER — MIDAZOLAM HYDROCHLORIDE 1 MG/ML
1 INJECTION INTRAMUSCULAR; INTRAVENOUS ONCE
Status: COMPLETED | OUTPATIENT
Start: 2024-06-24 | End: 2024-06-24

## 2024-06-24 RX ORDER — SODIUM CHLORIDE, SODIUM LACTATE, POTASSIUM CHLORIDE, CALCIUM CHLORIDE 600; 310; 30; 20 MG/100ML; MG/100ML; MG/100ML; MG/100ML
40 INJECTION, SOLUTION INTRAVENOUS CONTINUOUS
Status: DISCONTINUED | OUTPATIENT
Start: 2024-06-24 | End: 2024-06-24 | Stop reason: HOSPADM

## 2024-06-24 RX ORDER — METOCLOPRAMIDE HYDROCHLORIDE 5 MG/ML
10 INJECTION INTRAMUSCULAR; INTRAVENOUS ONCE
Status: COMPLETED | OUTPATIENT
Start: 2024-06-24 | End: 2024-06-24

## 2024-06-24 RX ORDER — FAMOTIDINE 10 MG/ML
20 INJECTION INTRAVENOUS ONCE
Status: COMPLETED | OUTPATIENT
Start: 2024-06-24 | End: 2024-06-24

## 2024-06-24 RX ORDER — ONDANSETRON HYDROCHLORIDE 2 MG/ML
4 INJECTION, SOLUTION INTRAVENOUS EVERY 8 HOURS PRN
Status: DISCONTINUED | OUTPATIENT
Start: 2024-06-24 | End: 2024-06-26 | Stop reason: HOSPADM

## 2024-06-24 RX ORDER — ALBUTEROL SULFATE 0.83 MG/ML
2.5 SOLUTION RESPIRATORY (INHALATION) EVERY 30 MIN PRN
Status: DISCONTINUED | OUTPATIENT
Start: 2024-06-24 | End: 2024-06-24 | Stop reason: HOSPADM

## 2024-06-24 RX ORDER — OXYCODONE HYDROCHLORIDE 5 MG/1
10 TABLET ORAL EVERY 4 HOURS PRN
Status: DISCONTINUED | OUTPATIENT
Start: 2024-06-24 | End: 2024-06-26 | Stop reason: HOSPADM

## 2024-06-24 SDOH — SOCIAL STABILITY: SOCIAL INSECURITY: HAVE YOU HAD THOUGHTS OF HARMING ANYONE ELSE?: NO

## 2024-06-24 SDOH — SOCIAL STABILITY: SOCIAL INSECURITY: ABUSE: ADULT

## 2024-06-24 SDOH — SOCIAL STABILITY: SOCIAL INSECURITY: HAVE YOU HAD ANY THOUGHTS OF HARMING ANYONE ELSE?: NO

## 2024-06-24 SDOH — SOCIAL STABILITY: SOCIAL INSECURITY: WERE YOU ABLE TO COMPLETE ALL THE BEHAVIORAL HEALTH SCREENINGS?: YES

## 2024-06-24 SDOH — SOCIAL STABILITY: SOCIAL INSECURITY: ARE YOU OR HAVE YOU BEEN THREATENED OR ABUSED PHYSICALLY, EMOTIONALLY, OR SEXUALLY BY ANYONE?: NO

## 2024-06-24 SDOH — SOCIAL STABILITY: SOCIAL INSECURITY: DO YOU FEEL UNSAFE GOING BACK TO THE PLACE WHERE YOU ARE LIVING?: NO

## 2024-06-24 SDOH — SOCIAL STABILITY: SOCIAL INSECURITY: DOES ANYONE TRY TO KEEP YOU FROM HAVING/CONTACTING OTHER FRIENDS OR DOING THINGS OUTSIDE YOUR HOME?: NO

## 2024-06-24 SDOH — SOCIAL STABILITY: SOCIAL INSECURITY: ARE THERE ANY APPARENT SIGNS OF INJURIES/BEHAVIORS THAT COULD BE RELATED TO ABUSE/NEGLECT?: NO

## 2024-06-24 SDOH — SOCIAL STABILITY: SOCIAL INSECURITY: HAS ANYONE EVER THREATENED TO HURT YOUR FAMILY OR YOUR PETS?: NO

## 2024-06-24 SDOH — HEALTH STABILITY: MENTAL HEALTH: CURRENT SMOKER: 0

## 2024-06-24 SDOH — SOCIAL STABILITY: SOCIAL INSECURITY: DO YOU FEEL ANYONE HAS EXPLOITED OR TAKEN ADVANTAGE OF YOU FINANCIALLY OR OF YOUR PERSONAL PROPERTY?: NO

## 2024-06-24 ASSESSMENT — LIFESTYLE VARIABLES
AUDIT-C TOTAL SCORE: 0
HOW OFTEN DO YOU HAVE 6 OR MORE DRINKS ON ONE OCCASION: NEVER
HOW MANY STANDARD DRINKS CONTAINING ALCOHOL DO YOU HAVE ON A TYPICAL DAY: PATIENT DOES NOT DRINK
SKIP TO QUESTIONS 9-10: 1
AUDIT-C TOTAL SCORE: 0
HOW OFTEN DO YOU HAVE A DRINK CONTAINING ALCOHOL: NEVER

## 2024-06-24 ASSESSMENT — PAIN - FUNCTIONAL ASSESSMENT
PAIN_FUNCTIONAL_ASSESSMENT: 0-10
PAIN_FUNCTIONAL_ASSESSMENT: 0-10
PAIN_FUNCTIONAL_ASSESSMENT: FLACC (FACE, LEGS, ACTIVITY, CRY, CONSOLABILITY)
PAIN_FUNCTIONAL_ASSESSMENT: 0-10
PAIN_FUNCTIONAL_ASSESSMENT: FLACC (FACE, LEGS, ACTIVITY, CRY, CONSOLABILITY)
PAIN_FUNCTIONAL_ASSESSMENT: 0-10

## 2024-06-24 ASSESSMENT — PAIN SCALES - WONG BAKER
WONGBAKER_NUMERICALRESPONSE: HURTS WHOLE LOT

## 2024-06-24 ASSESSMENT — COGNITIVE AND FUNCTIONAL STATUS - GENERAL
MOVING TO AND FROM BED TO CHAIR: A LITTLE
MOVING TO AND FROM BED TO CHAIR: A LITTLE
DAILY ACTIVITIY SCORE: 24
HELP NEEDED FOR BATHING: A LOT
STANDING UP FROM CHAIR USING ARMS: A LITTLE
DAILY ACTIVITIY SCORE: 16
DRESSING REGULAR UPPER BODY CLOTHING: A LITTLE
STANDING UP FROM CHAIR USING ARMS: A LITTLE
MOVING TO AND FROM BED TO CHAIR: A LITTLE
TOILETING: A LITTLE
WALKING IN HOSPITAL ROOM: A LITTLE
CLIMB 3 TO 5 STEPS WITH RAILING: A LITTLE
MOBILITY SCORE: 18
MOBILITY SCORE: 18
MOBILITY SCORE: 21
PERSONAL GROOMING: A LITTLE
TURNING FROM BACK TO SIDE WHILE IN FLAT BAD: A LITTLE
PATIENT BASELINE BEDBOUND: NO
CLIMB 3 TO 5 STEPS WITH RAILING: A LITTLE
WALKING IN HOSPITAL ROOM: A LITTLE
DAILY ACTIVITIY SCORE: 23
MOVING FROM LYING ON BACK TO SITTING ON SIDE OF FLAT BED WITH BEDRAILS: A LITTLE
DRESSING REGULAR LOWER BODY CLOTHING: A LOT
WALKING IN HOSPITAL ROOM: A LITTLE
CLIMB 3 TO 5 STEPS WITH RAILING: TOTAL
TOILETING: A LOT

## 2024-06-24 ASSESSMENT — PAIN SCALES - GENERAL
PAINLEVEL_OUTOF10: 3
PAINLEVEL_OUTOF10: 7
PAINLEVEL_OUTOF10: 4
PAINLEVEL_OUTOF10: 3
PAINLEVEL_OUTOF10: 10 - WORST POSSIBLE PAIN
PAINLEVEL_OUTOF10: 10 - WORST POSSIBLE PAIN
PAINLEVEL_OUTOF10: 9
PAINLEVEL_OUTOF10: 6
PAINLEVEL_OUTOF10: 10 - WORST POSSIBLE PAIN
PAINLEVEL_OUTOF10: 6
PAINLEVEL_OUTOF10: 7
PAINLEVEL_OUTOF10: 9
PAINLEVEL_OUTOF10: 4
PAINLEVEL_OUTOF10: 10 - WORST POSSIBLE PAIN
PAINLEVEL_OUTOF10: 0 - NO PAIN
PAINLEVEL_OUTOF10: 8
PAINLEVEL_OUTOF10: 3
PAINLEVEL_OUTOF10: 10 - WORST POSSIBLE PAIN
PAINLEVEL_OUTOF10: 5 - MODERATE PAIN

## 2024-06-24 ASSESSMENT — ACTIVITIES OF DAILY LIVING (ADL)
WALKS IN HOME: INDEPENDENT
TOILETING: INDEPENDENT
BATHING: INDEPENDENT
BATHING_ASSISTANCE: MODERATE
HEARING - LEFT EAR: FUNCTIONAL
ASSISTIVE_DEVICE: WALKER
ADL_ASSISTANCE: INDEPENDENT
HEARING - RIGHT EAR: FUNCTIONAL
PATIENT'S MEMORY ADEQUATE TO SAFELY COMPLETE DAILY ACTIVITIES?: YES
JUDGMENT_ADEQUATE_SAFELY_COMPLETE_DAILY_ACTIVITIES: YES
DRESSING YOURSELF: INDEPENDENT
ADL_ASSISTANCE: INDEPENDENT
GROOMING: INDEPENDENT
ADEQUATE_TO_COMPLETE_ADL: YES
LACK_OF_TRANSPORTATION: NO
FEEDING YOURSELF: INDEPENDENT

## 2024-06-24 ASSESSMENT — PAIN DESCRIPTION - ORIENTATION
ORIENTATION: LEFT

## 2024-06-24 ASSESSMENT — COLUMBIA-SUICIDE SEVERITY RATING SCALE - C-SSRS
2. HAVE YOU ACTUALLY HAD ANY THOUGHTS OF KILLING YOURSELF?: NO
6. HAVE YOU EVER DONE ANYTHING, STARTED TO DO ANYTHING, OR PREPARED TO DO ANYTHING TO END YOUR LIFE?: NO
6. HAVE YOU EVER DONE ANYTHING, STARTED TO DO ANYTHING, OR PREPARED TO DO ANYTHING TO END YOUR LIFE?: NO
1. IN THE PAST MONTH, HAVE YOU WISHED YOU WERE DEAD OR WISHED YOU COULD GO TO SLEEP AND NOT WAKE UP?: NO
2. HAVE YOU ACTUALLY HAD ANY THOUGHTS OF KILLING YOURSELF?: NO
1. IN THE PAST MONTH, HAVE YOU WISHED YOU WERE DEAD OR WISHED YOU COULD GO TO SLEEP AND NOT WAKE UP?: NO

## 2024-06-24 ASSESSMENT — PAIN DESCRIPTION - LOCATION
LOCATION: KNEE

## 2024-06-24 ASSESSMENT — PAIN DESCRIPTION - DESCRIPTORS
DESCRIPTORS: SHARP;ACHING
DESCRIPTORS: ACHING

## 2024-06-24 ASSESSMENT — PATIENT HEALTH QUESTIONNAIRE - PHQ9
SUM OF ALL RESPONSES TO PHQ9 QUESTIONS 1 & 2: 0
2. FEELING DOWN, DEPRESSED OR HOPELESS: NOT AT ALL
1. LITTLE INTEREST OR PLEASURE IN DOING THINGS: NOT AT ALL

## 2024-06-24 NOTE — OP NOTE
Arthroplasty Resurfacing Total Knee  [address] - [place] ARTHRP KNE CONDYLE&PLATU MEDIAL&LAT COMPARTMENTS   Operative Note     Date: 2024  OR Location: [facility]    Name: Peggy Menjivar : 1950, Age: 73 y.o., MRN: [id number], Sex: female    PRE Diagnosis  Left knee osteoarthritis  Morbid obesity BMI 41    POST Diagnosis  Same    Procedures  Left total knee arthroplasty subvastus approach    Surgeons      * Joe Katz - Primary    Resident/Fellow/Other Assistant:  tatiana Marquez courtney    Procedure Summary  Implants:  Styker Cemented Triathlon Total Knee: Femur size 3 posterior stabilized, size 3 primary tibial baseplate, size A32 N2Vac Patella, size 3x9 posterior stabilized N2Vac tibial bearing insert     Anesthesia: general lma with adductor canal block  ASA: III  Anesthesia Staff:   Anesthesiologist: Iban Gonzalez DO  CRNA: RAMAKRISHNA Perez  Estimated Blood Loss: 50 mL    Specimen: No specimens collected     Staff:   Circulator: Kristen Gustafson RN  Scrub Person: Ranjit Gao; Lisa Cardoza         Drains and/or Catheters:   2 Hemovac    Tourniquet Times:     Total Tourniquet Time Documented:  Thigh (Left) - 5 minutes  Thigh (Left) - 59 minutes  Total: Thigh (Left) - 64 minutes             Findings: Patient has severe degenerative change of the lateral medial joint space with overall valgus alignment.  Peripheral osteophyte specifically over the superior pole patella as well.  Stable implantation of the above components.  Neutral alignment stable in extension to an axial loading exam.  Patella tracked normally through a subvastus approach.    Indications: Peggy Menjivar is an 73 y.o. female who is having surgery for left knee osteoarthritis.   Patient presented with pain and symptoms of her left knee.  Progressive over time.  Affecting daily activities walking ability and exercise.  She had reached a point of disability conservative care not affecting pain  or symptoms.  X-rays revealed severe degenerative change with bone-on-bone apposition and valgus alignment.  She had previous undergone a right knee replacement and done well with this.  I discussed with her further options and she wished to proceed with a knee replacement surgery.  I discussed with the patient the risks, benefits, alternatives, complications of a total knee replacement.  The risks, including but not limited to, deep vein thrombosis, pulmonary embolism, infection, knee stiffness, periprosthetic fracture, damage to ligaments tendons or neurovascular structures was discussed.  Despite these risks they elected to proceed.      Procedure Details: Medications:  Antibiotic Ancef 2 g IV,Tranexamic acid 2 g IV, Decadron 10 milligrams IV    Patient was seen and evaluated in the preoperative area and the left leg was marked as the operative extremity.  They were then brought back to the operating room after anesthesia administered an adductor canal block.  Patient was laid supine on the table and anesthesia team controlled the head neck and airway administered general anesthetic.  Castellon catheter was inserted.  The well leg was wrapped and secured to the table.  The operative leg was prepped with a well-padded thigh tourniquet.  We then washed the skin with a chlorhexidine wash and alcohol.  We then prepped with a ChloraPrep and draped in the usual fashion.  An operative time-out was performed.  I then exsanguinated the leg with an Esmarch and elevated the tourniquet to 250 millimeters of mercury.  It was noted after skin incision that this was a venous tourniquet.  The tourniquet was packed hemostasis was obtained the leg was rewrapped with the Esmarch and elevated to 300 mmHg.  This was maintained for the remainder of the case.    I marked out the anterior landmarks of the knee and a midline incision.  I created the incision with the scalpel and used the Bovie to dissect down through the subcutaneous tissue  to the level of the extensor mechanism.  I created full-thickness skin flaps medial and laterally.  Identified the VMO and placed a Hohmann retractor deep to this to retract the extensor mechanism laterally.  Marked out the subvastus approach.  I created this with the scalpel.  I then used the Bovie to dissect a subperiosteal dissection off the proximal medial tibia.  I removed the infrapatellar fat pad.  I released the lateral synovium and was able to flex the knee and translate the patella laterally.  Patient had severe wear of the lateral compartment.  I marked out Whitesides line and debrided the anterior cruciate ligament.  I debrided the intercondylar notch and used a drill to enter the femoral canal.  I then used a flexible intramedullary alignment guide with distal cutting jig set for 5 degree valgus with an 8 millimeter resection.  I set this on the more prominent medial condyle.  I pinned this into position and protected the soft tissues while I  resected the distal femur.  My cut measured at 6 millimeters.   I now turned my attention to the tibia.  I used a extramedullary guide centered in the tibial spines.  I aligned it with respect to the medial 3rd of the tubercle, to tibial crest, and centered over talus.  I adjusted for a neutral slope.  I pinned the block in position with  a 4 millimeter resection off the medial side.  I then used a drop ace as a secondary check.  I then protected the soft tissues and resected the proximal tibia.  I measured the lateral thickness at 6 millimeters.  I now brought the knee into extension and used the gap balancing block.  I was satisfied with the alignment and the extension gap.  Now returned to the femur using the 3 degree external rotation sizing guide and pinned this into position  with respect to Whitesides line, epicondylar axis and a tibial cut.  I measured the femur at a size 3. I placed the 4 in 1 cutting block and pinned this down.  I then resected the  anterior and posterior condyles and chamfers.  I then cleaned the bone cuts with a rongeur and an osteotome.  I now placed the box cutting guide in line with the lateral border of the femur.  I pinned this into position then used a distal chisel and oscillating saw to resect the box and removed the PCL with the Bovie.  I now clean the posterior aspect of the knee with the Bovie removing the medial and lateral meniscus in their entirety.  I preserved the popliteus tendon.  I then took a curved osteotome and resected the posterior condylar osteophytes bilaterally  decompressing the posterior space.  I provisionally sized the tibia to a size 3. I did a trial reduction with the femur, tibia and a 9 millimeter insert.   I brought the knee through range of motion was satisfied with the tracking and varus valgus stability throughout.  The native patella tracked normally.  I everted the patella with 2 towel clips.  I circumferentially burned around this with the Bovie.    The patella measured 21 in thickness. I took a freehand resection down to 13 millimeters of bone circumferentially.   I sized an asymmetric 32.  I alligned the drill guide with the medial border and drilled for the cemented component.  I then placed a trial and tested the kinematics and the patella tracked normally through the sub vastus approach.  I now removed the trial components and finished my tibial preparation.  I pinned the base plate into position and then used the keel punch and an oversized keel punched for the cemented technique.  I now removed this and thoroughly irrigated the soft tissue and bone in preparation for implantation.  I used standard cementation technique.  I flexed the knee and translated the tibia forward.  I finger packed the cement into the keel and then seated the tibial component reducing the removing the extra cement.  I then finger packed cement onto the distal femur seated the femoral component and placed the trial  polyethylene as the cement cured.  I also irrigated clean and dried the patella seated the patellar component. After cement had thoroughly cured I tested the kinematics with the size 9 millimeter insert and was satisfied with the tracking, alignment, and stability of the knee.  I removed this insert trial, cleaned the locking mechanism and seated the posterior stabilized tibial bearing insert.  The fit was secure medial and laterally.  I extended the knee and irrigated with Betadine.  I washed this out with normal saline.  I packed the need and dropped the tourniquet to obtain hemostasis. I then placed 1 intra-articular drain and closed the arthrotomy in a semi flexed position with #2 Ethibond for a watertight seal tested through range of motion.  I then placed an extra-articular drain, irrigated and closed in a layered fashion of running 0 Vicryl, buried interrupted 2-0 Vicryl, running 4-0 Monocryl.  Dermabond and a silver dressing were applied.  The patient tolerated procedure well and was awakened taken to PACU in stable condition.  No complications encountered.  Complications:  None; patient tolerated the procedure well.    Disposition: PACU - hemodynamically stable.  Condition: stable             Attending Attestation: I performed the procedure.    Joe Kazt  Phone Number: [phone]

## 2024-06-24 NOTE — NURSING NOTE
Received report, assumed car of pt. Pt resting in bed watching TV. No c/o at this time. Call light in reach.

## 2024-06-24 NOTE — ANESTHESIA PREPROCEDURE EVALUATION
"Patient: Peggy Menjivar    Procedure Information       Date/Time: 06/24/24 0730    Procedure: Arthroplasty Resurfacing Total Knee (Left: Knee)    Location: [facility] / [facility]    Surgeons: Joe Katz MD          Past Medical History:   Diagnosis Date    Adverse effect of anesthesia     \"Chills\" and \"I have alot of mucus\"    Breast cancer (Multi)     left  breast    HLD (hyperlipidemia)     HTN (hypertension)     Osteoarthritis     Seizure (Multi)     last 1985    Spinal stenosis        Past Surgical History:   Procedure Laterality Date    BREAST SURGERY Left     breast cancer,  lumpectomy/mastectomy/reconstruction    CHOLECYSTECTOMY      COLONOSCOPY      HYSTERECTOMY      TOTAL KNEE ARTHROPLASTY Right 01/19/2023         Relevant Problems   Anesthesia (within normal limits)      Cardiac  Neg stress test 2022   (+) HTN (hypertension)   (+) Hyperlipidemia      Neuro   (+) Seizures (Multi)      Endocrine   (+) Class 3 severe obesity in adult (Multi)       Clinical information reviewed:   Tobacco  Allergies  Meds  Problems  Med Hx  Surg Hx  OB Status    Fam Hx  Soc Hx        NPO Detail:  NPO/Void Status  Carbohydrate Drink Given Prior to Surgery? : N  Date of Last Liquid: 06/24/24  Time of Last Liquid: 0430  Date of Last Solid: 06/24/24  Time of Last Solid: 2200  Last Intake Type: Clear fluids (sip of water with am meds)  Time of Last Void: 0611         Physical Exam    Airway  Mallampati: II  TM distance: >3 FB  Neck ROM: full     Cardiovascular    Dental    Pulmonary    Abdominal            Anesthesia Plan    History of general anesthesia?: yes  History of complications of general anesthesia?: no    ASA 3     general and regional     The patient is not a current smoker.  Patient was not previously instructed to abstain from smoking on day of procedure.  Patient did not smoke on day of procedure.  Education provided regarding risk of obstructive sleep apnea.  intravenous induction   Postoperative " administration of opioids is intended.  Anesthetic plan and risks discussed with patient.  Use of blood products discussed with patient who did not consent to blood products.    Plan discussed with CRNA and CAA.

## 2024-06-24 NOTE — ANESTHESIA PROCEDURE NOTES
Airway  Date/Time: 6/24/2024 7:52 AM  Urgency: elective    Airway not difficult    Staffing  Performed: CRNA   Authorized by: Iban Gonzalez DO    Performed by: ADELA Perez-ERWIN  Patient location during procedure: OR    Indications and Patient Condition  Indications for airway management: anesthesia  Spontaneous Ventilation: absent  Sedation level: deep  Preoxygenated: yes  Patient position: sniffing  Mask difficulty assessment: 0 - not attempted    Final Airway Details  Final airway type: supraglottic airway      Successful airway: classic  Size 3     Number of attempts at approach: 1          
Peripheral Block    Patient location during procedure: pre-op  Start time: 6/24/2024 7:41 AM  End time: 6/24/2024 7:42 AM  Reason for block: at surgeon's request and post-op pain management  Staffing  Performed: attending   Authorized by: Iban Gonzalez DO    Performed by: Iban Gonzalez DO  Preanesthetic Checklist  Completed: patient identified, IV checked, site marked, risks and benefits discussed, surgical consent, monitors and equipment checked, pre-op evaluation and timeout performed   Timeout performed at: 6/24/2024 7:38 AM  Peripheral Block  Patient position: laying flat  Prep: ChloraPrep  Patient monitoring: heart rate, cardiac monitor and continuous pulse ox  Block type: adductor canal  Laterality: left  Injection technique: single-shot  Guidance: ultrasound guided  Local infiltration: ropivacaine  Infiltration strength: 0.5 %  Dose: 30 mL  Needle  Needle gauge: 22 G  Needle length: 10 cm  Needle localization: ultrasound guidance     image stored in chart  Assessment  Injection assessment: negative aspiration for heme, no paresthesia on injection and incremental injection  Paresthesia pain: none  Heart rate change: no  Slow fractionated injection: yes  Additional Notes  With 4mg decadron          
Peripheral Block    Patient location during procedure: pre-op  Start time: 6/24/2024 7:42 AM  End time: 6/24/2024 7:43 AM  Reason for block: at surgeon's request and post-op pain management  Staffing  Performed: attending   Authorized by: Iban Gonzalez DO    Performed by: Iban Gonzalez DO  Preanesthetic Checklist  Completed: patient identified, IV checked, site marked, risks and benefits discussed, surgical consent, monitors and equipment checked, pre-op evaluation and timeout performed   Timeout performed at: 6/24/2024 7:38 AM  Peripheral Block  Patient position: laying flat  Prep: ChloraPrep  Patient monitoring: heart rate, cardiac monitor and continuous pulse ox  Block type: other (IPACK)  Laterality: left  Injection technique: single-shot  Guidance: ultrasound guided  Infiltration strength: 0.5 %  Dose: 10 mL  Needle  Needle gauge: 22 G  Needle length: 10 cm  Needle localization: ultrasound guidance     image stored in chart  Assessment  Injection assessment: negative aspiration for heme, no paresthesia on injection and incremental injection  Paresthesia pain: none  Heart rate change: no  Slow fractionated injection: yes          
No indicators present

## 2024-06-24 NOTE — CONSULTS
Inpatient consult to Medicine  Consult performed by: ADELA Atkins-CNP  Consult ordered by: Joe Katz MD          Reason For Consult  Management of hypertension and hyperlipidemia    History Of Present Illness  Peggy Menjivar is a 73 y.o. female presenting with osteoarthritis of the left knee.  He is status post left total knee arthroplasty, lateral approach with Dr. Katz today.  She states that she has pain in her left knee that is controlled well with current pain control regimen.  She denies chest pain, shortness of breath, or abdominal pain.  She further denies headache, dizziness, nausea/vomiting.     Past Medical History  She has a past medical history of Adverse effect of anesthesia, Breast cancer (Multi), HLD (hyperlipidemia), HTN (hypertension), Osteoarthritis, Seizure (Multi), and Spinal stenosis.    Surgical History  She has a past surgical history that includes Breast surgery (Left); Cholecystectomy; Total knee arthroplasty (Right, 01/19/2023); Colonoscopy; and Hysterectomy.     Social History  She reports that she quit smoking about 54 years ago. Her smoking use included cigarettes. She has never used smokeless tobacco. She reports current alcohol use. She reports that she does not use drugs.    Family History  No family history on file.     Allergies  Patient has no known allergies.    Review of Systems  All systems reviewed and negative except as noted in HPI     Physical Exam  Constitutional: No acute distress, calm, cooperative  HEENT: PERRL, normocephalic, atraumatic, mucous membranes moist  Cardiovascular: Regular rhythm and rate,   Respiratory: Lungs clear to auscultation,   Gastrointestinal: Bowel sounds positive x 4, soft, nontender  Neurologic: Alert and oriented x 3 equal strength bilaterally  Musculoskeletal: Left leg with limited movement status post TKA  Skin: Left knee dressing dry and intact Hemovac with serosanguineous drainage       Last Recorded Vitals  /69    Pulse 91   Temp 36 °C (96.8 °F) (Temporal)   Resp 14   Wt 104 kg (229 lb 4.5 oz)   SpO2 94%        Assessment/Plan     Left knee osteoarthritis  -Status post left total knee arthroplasty postop day 0  -Pain control  -Incentive spirometry  -Management per primary team    Hypertension  -Continue home amlodipine    Hyperlipidemia  -Continue home atorvastatin    DVT prophylaxis  -Per primary team    Check CBC and BMP in the a.m.    Stephane Jacob APRN-CNP

## 2024-06-24 NOTE — SIGNIFICANT EVENT
Pt states only her right side is shivering now. Not observed. Pain moderate. Dr. Gonzalez at the bedside observing pt. Medicated for sustained pain. POC maintained.

## 2024-06-24 NOTE — ANESTHESIA POSTPROCEDURE EVALUATION
Patient: Peggy Menjivar    Procedure Summary       Date: 06/24/24 Room / Location: [facility] OR 08 / [facility]    Anesthesia Start: 0744 Anesthesia Stop: 1013    Procedure: Arthroplasty Resurfacing Total Knee (Left: Knee) Diagnosis:       Primary osteoarthritis of left knee      (left knee osteoarthritis)    Surgeons: Joe Katz MD Responsible Provider: Iban Gonzalez DO    Anesthesia Type: general, regional ASA Status: 3            Anesthesia Type: general, regional    Vitals Value Taken Time   /71 06/24/24 1016   Temp 36 °C (96.8 °F) 06/24/24 1009   Pulse 87 06/24/24 1016   Resp 12 06/24/24 1016   SpO2 100 % 06/24/24 1016       Anesthesia Post Evaluation    Patient location during evaluation: bedside  Patient participation: complete - patient participated  Level of consciousness: awake and alert  Pain management: adequate  Multimodal analgesia pain management approach  Airway patency: patent  Two or more strategies used to mitigate risk of obstructive sleep apnea  Cardiovascular status: acceptable  Respiratory status: acceptable  Hydration status: acceptable  Postoperative Nausea and Vomiting: none        There were no known notable events for this encounter.

## 2024-06-24 NOTE — PROGRESS NOTES
Occupational Therapy    Evaluation/Treatment    Patient Name: Peggy Menjivar  MRN: [id number]  : 1950  Today's Date: 24  Time Calculation  Start Time: 1315  Stop Time: 1340  Time Calculation (min): 25 min       Assessment:  OT Assessment: Pt presents on eval with generalized weakness after surgery, increased LLE pain/limitations, decreased activity tolerance, and impaired standing balance affecting her self-care and functional transfers/mobility. Pt will benefit from continued skilled OT to address these deficits.  Prognosis: Good  Evaluation/Treatment Tolerance: Patient limited by pain, Patient limited by fatigue  End of Session Communication: Bedside nurse  End of Session Patient Position: Bed, 3 rail up, Alarm on (Needs in reach.)  OT Assessment Results: Decreased ADL status, Decreased endurance, Decreased functional mobility, Decreased IADLs    Plan:  Treatment Interventions: ADL retraining, Functional transfer training, Endurance training, Patient/family training, Equipment evaluation/education, Neuromuscular reeducation, Compensatory technique education  OT Frequency: 5 times per week  OT Discharge Recommendations: Low intensity level of continued care  Equipment Recommended upon Discharge: Wheeled walker  OT Recommended Transfer Status: Assist of 1  OT - OK to Discharge: Yes      Subjective     General:   OT Received On: 24  General  Reason for Referral: s/p L TKR, impaired ADL's/mobility  Referred By: Joe Katz MD  Past Medical History Relevant to Rehab: breast CA, HTN, OA, seizure, spinal stenosis, juvenal, R TKR, hyst  Family/Caregiver Present: Yes  Caregiver Feedback: Spouse at bedside.  Prior to Session Communication: Bedside nurse  Patient Position Received: Bed, 3 rail up, Alarm off, not on at start of session  General Comment: Pt is a 72 yo female admitted to the hospital for an elective L TKR by Dr. Katz.    Precautions:  LE Weight Bearing Status: Weight Bearing as  "Tolerated (LLE)  Medical Precautions: Fall precautions, Oxygen therapy device and L/min (2L 02)  Post-Surgical Precautions: Left total knee precautions (Handout provided)    Vital Signs:  SpO2: 97 %    Pain:  Pain Assessment  Pain Assessment: 0-10  0-10 (Numeric) Pain Score: 9  Pain Type: Surgical pain, Acute pain  Pain Location: Knee  Pain Orientation: Left  Pain Interventions: Ambulation/increased activity, Other (Comment) (Nurse in room to medicate for pain upon arrival)    Objective     Cognition:  Overall Cognitive Status: Within Functional Limits  Orientation Level: Oriented X4    Home Living:  Type of Home: House  Lives With: Spouse  Home Adaptive Equipment: Cane, Walker rolling or standard  Home Layout: Multi-level, Stairs to alternate level with rails  Alternate Level Stairs-Rails:  (unilateral)  Alternate Level Stairs-Number of Steps: 12 down and 12 up  Home Access: Stairs to enter with rails  Entrance Stairs-Rails:  (unilateral)  Entrance Stairs-Number of Steps: 2+3  Bathroom Shower/Tub: Tub/shower unit  Bathroom Toilet: Standard  Bathroom Equipment: Other (Comment) (pt reports having a \"towel rack\" to use that is stable)    Prior Function:  Level of [place]: Independent with ADLs and functional transfers, Independent with homemaking with ambulation  ADL Assistance: Independent  Homemaking Assistance: Independent  Ambulatory Assistance: Independent  Vocational: Retired  Hand Dominance: Right  Prior Function Comments: Spouse does most of the driving.    ADL:  Eating Assistance: Independent  Grooming Assistance: Minimal  Grooming Deficit: Steadying, Standing with assistive device, Verbal cueing  Bathing Assistance: Moderate  UE Dressing Assistance: Stand by  UE Dressing Deficit: Setup  LE Dressing Assistance: Maximal  LE Dressing Deficit: Don/doff L sock  Toileting Assistance with Device: Moderate    Activity Tolerance:  Activity Tolerance Comments: impaired due to reports of fatigue and increased LLE " pain    Bed Mobility/Transfers: Bed Mobility  Bed Mobility:  (Pt completed supine to sit in bed with CGA to support only for her LLE. Pt then completed sit to supine in bed with min A to lift her LLE back into bed.)    Transfers  Transfer:  (Pt completed sit<>stand with mod A for balance/safety and due to increased LLE pain.)    Functional Mobility:  Functional Mobility  Functional Mobility Performed:  (Pt able to take a few lateral steps towards the HOB using a RW with mod A for balance/safety and verbal cues for sequencing.)    Sitting Balance:  Static Sitting Balance  Static Sitting-Balance Support: Bilateral upper extremity supported, Feet supported  Static Sitting-Level of Assistance: Close supervision    Standing Balance:  Static Standing Balance  Static Standing-Balance Support: Bilateral upper extremity supported  Static Standing-Level of Assistance: Moderate assistance, Minimum assistance    Therapy/Activity: Therapeutic Activity  Therapeutic Activity Performed:  (See bed mobility, transfers, functional mobility, static sitting balance, and static standing balance. Education also provided regarding knee precautions.)    Sensation:  Sensation Comment: pt denies paresthesias    Strength:  Strength Comments: Dino 4/5    Coordination:  Coordination Comment: Dino WFL     Hand Function:  Hand Function  Gross Grasp: Functional  Coordination: Functional      Outcome Measures: [facility] Daily Activity  Putting on and taking off regular lower body clothing: A lot  Bathing (including washing, rinsing, drying): A lot  Putting on and taking off regular upper body clothing: A little  Toileting, which includes using toilet, bedpan or urinal: A lot  Taking care of personal grooming such as brushing teeth: A little  Eating Meals: None  Daily Activity - Total Score: 16      Education Documentation  Handouts, taught by Ye Robert Jr., OT at 6/24/2024  2:19 PM.  Learner: Patient  Readiness: Acceptance  Method: Explanation,  Handout, Demonstration  Response: Verbalizes Understanding    Precautions, taught by Ye Robert Jr., OT at 6/24/2024  2:19 PM.  Learner: Patient  Readiness: Acceptance  Method: Explanation, Handout, Demonstration  Response: Verbalizes Understanding    ADL Training, taught by Ye Robert Jr., OT at 6/24/2024  2:19 PM.  Learner: Patient  Readiness: Acceptance  Method: Explanation, Handout, Demonstration  Response: Verbalizes Understanding    Education Comments  No comments found.      Goals:  Encounter Problems       Encounter Problems (Active)       OT Goals       Pt will complete all functional transfers and mobility with mod indep using a RW. (Progressing)       Start:  06/24/24    Expected End:  07/29/24            Pt will tolerate functional mobility for a household distance using a RW with mod indep. (Progressing)       Start:  06/24/24    Expected End:  07/29/24            Pt will complete all ADL's with mod indep/indep using a device as needed. (Progressing)       Start:  06/24/24    Expected End:  07/29/24

## 2024-06-24 NOTE — PROGRESS NOTES
Physical Therapy    Physical Therapy Evaluation & Treatment    Patient Name: Peggy Menjivar  MRN: [id number]  Today's Date: 6/24/2024   Time Calculation  Start Time: 1459  Stop Time: 1517  Time Calculation (min): 18 min    Assessment/Plan   PT Assessment  PT Assessment Results: Decreased strength, Decreased range of motion, Decreased endurance, Impaired balance, Decreased mobility, Pain  Rehab Prognosis: Good  Strengths: Support and attitude of living partners, Premorbid level of function, Access to adaptive/assistive products  Barriers to Participation: Comorbidities  Assessment Comment: She presented with decreased muscular strength/endurance and impaired L knee AROM. She required minimally increased assist during functional mobility today. Pt would benefit from continued skilled PT services for maximizing independence and safety prior to & after discharge (low intensity).  End of Session Patient Position: Bed, 3 rail up, Alarm on (call light and tray table within reah; spouse in room)   IP OR SWING BED PT PLAN  Inpatient or Swing Bed: Inpatient  PT Plan  Treatment/Interventions: Bed mobility, Transfer training, Gait training, Stair training, Balance training, Strengthening, Range of motion, Therapeutic exercise, Therapeutic activity  PT Plan: Ongoing PT  PT Frequency: BID  PT Discharge Recommendations: Low intensity level of continued care  PT Recommended Transfer Status: Assist x1 (FWW)  PT - OK to Discharge: Yes      Subjective     General Visit Information:  General  Reason for Referral: Impaired functional mobility. This 73 year old female was admitted for an elective surgery. She was now s/p L TKA on 6/24/24 by Dr. Katz.  Past Medical History Relevant to Rehab: breast cancer, HTN, OA, seizure, spinal stenosis, juvenal, R TKA  Family/Caregiver Present: Yes  Caregiver Feedback:  (Spouse arrived duting session.)  Prior to Session Communication: Bedside nurse  Patient Position Received: Bed, 3 rail up, Alarm  on  General Comment: Pt agreed to session and participated fully.    Home Living:  Home Living  Type of Home: House  Lives With: Spouse  Home Adaptive Equipment: Cane (FWW)  Home Layout: Multi-level, Able to live on main level with bedroom/bathroom  Home Access: Stairs to enter with rails  Entrance Stairs-Rails:  (unilateral)  Entrance Stairs-Number of Steps: 2 + 3  Bathroom Shower/Tub: Tub/shower unit  Bathroom Toilet: Standard  Bathroom Equipment: None    Prior Level of Function:  Prior Function Per Pt/Caregiver Report  ADL Assistance: Independent  Homemaking Assistance: Independent  Ambulatory Assistance: Independent (denied any recent falls)  Transfers:  (Mod I)  Gait: Independent  Stairs:  (Mod I with rail)  Vocational: Retired  Hand Dominance: Right  Prior Function Comments: (+) driving but spouse does most of the driving    Precautions:  Precautions  Hearing/Visual Limitations:  (Hearing WFL; wears glasses)  LE Weight Bearing Status: Weight Bearing as Tolerated (LLE)  Medical Precautions: Fall precautions  Post-Surgical Precautions: Left total knee precautions  Precautions Comment: Pt unable to state any precautions prior to education.      Objective   Pain:  Pain Assessment  Pain Assessment: 0-10  0-10 (Numeric) Pain Score: 7  Pain Type: Surgical pain  Pain Location: Knee  Pain Orientation: Left  Pain Interventions: Ambulation/increased activity    Cognition:  Cognition  Overall Cognitive Status: Within Functional Limits  Orientation Level: Oriented X4    General Assessments:  Activity Tolerance  Endurance: Tolerates less than 10 min exercise, no significant change in vital signs    Sensation  Light Touch: Not tested (reported numbness in L toes)    ROM/Strength  BLE AROM: WFL except L knee limited as anticipated post-op  Strength Comments: based on observationof mobility, grossly >/=3+/5 exfept L quad >/=3-/5    Perception  Motor Planning: Appears intact    Coordination  Movements are Fluid and Coordinated:  Yes    Postural Control  Postural Control: Within Functional Limits  Posture Comment: fwd head posture    Static Sitting Balance  Static Sitting-Balance Support: Feet supported, No upper extremity supported  Static Sitting-Level of Assistance: Close supervision  Dynamic Sitting Balance  Dynamic Sitting-Balance Support: Bilateral upper extremity supported (unilateral LE supported)  Dynamic Sitting-Comments: Close S    Static Standing Balance  Static Standing-Balance Support: Bilateral upper extremity supported  Static Standing-Level of Assistance: Contact guard  Dynamic Standing Balance  Dynamic Standing-Balance Support: Bilateral upper extremity supported  Dynamic Standing-Comments: CGA x1    Functional Assessments:  Bed Mobility  Bed Mobility 1: Supine to sitting  Level of Assistance 1: Minimum assistance (assist to LLE)  Bed Mobility Comments 1: HOB elevated and used L bed rail    Bed Mobility  2: Sitting to supine  Level of Assistance 2: Contact guard  Bed Mobility Comments 2: bed flat, toward L side    Transfers  Technique 1: Sit to stand  Transfer Device 1: Walker  Transfer Level of Assistance 1: Minimum assistance (assist for lifitng torque)  Trials/Comments 1: x1 trial from EOB, hands started on stabilized walker    Technique 2: Stand to sit  Transfer Device 2: Walker  Transfer Level of Assistance 2: Contact guard  Trials/Comments 2: x trial at EOB    Ambulation/Gait Training  Surface 1: Level tile  Device 1: Rolling walker  Assistance 1: Contact guard  Comments/Distance (ft) 1: 3 lateral steps toward L side at EOB. Slow pacing. Able to clear bilateral feet. Steady gait.     Treatments:  Therapeutic Exercise  Seated EOB, LLE. Cued as needed for proper form to obtain maximal benefits.   -DF/PF x20   -LAQ x10    Outcome Measures:  [facility] Basic Mobility  Turning from your back to your side while in a flat bed without using bedrails: A little  Moving from lying on your back to sitting on the side of a flat bed  without using bedrails: A little  Moving to and from bed to chair (including a wheelchair): A little  Standing up from a chair using your arms (e.g. wheelchair or bedside chair): A little  To walk in hospital room: A little  Climbing 3-5 steps with railing: A little  Basic Mobility - Total Score: 18    Encounter Problems       Encounter Problems (Active)       Functional Mobility       Pt will transition supine<>sit with mod I.       Start:  06/24/24    Expected End:  07/21/24            Pt will transfer sit<>stand with FWW & mod I.       Start:  06/24/24    Expected End:  07/21/24            Pt will ambulate >/=150 ft with FWW & mod I.       Start:  06/24/24    Expected End:  07/21/24            Pt will negotiate 5 stairs with unilateral rail and mod I.       Start:  06/24/24    Expected End:  07/21/24                   Education Documentation  Precautions, taught by Zaria Morse PT at 6/24/2024  3:30 PM.  Learner: Patient  Readiness: Acceptance  Method: Explanation  Response: Verbalizes Understanding, Demonstrated Understanding, Needs Reinforcement    Home Exercise Program, taught by Zaria Morse PT at 6/24/2024  3:30 PM.  Learner: Patient  Readiness: Acceptance  Method: Explanation  Response: Verbalizes Understanding, Demonstrated Understanding, Needs Reinforcement    Mobility Training, taught by Zaria Morse PT at 6/24/2024  3:30 PM.  Learner: Patient  Readiness: Acceptance  Method: Explanation  Response: Verbalizes Understanding, Demonstrated Understanding, Needs Reinforcement    Education Comments  No comments found.

## 2024-06-24 NOTE — NURSING NOTE
Assumed care of patient. Orientated to room and call light explained. Castellon in place with clear output. IV in place and LR ordered. SCDs in place. Patient still complaining of severe pain. Call light in reach and  at bedside.

## 2024-06-25 ENCOUNTER — HOME HEALTH ADMISSION (OUTPATIENT)
Dept: HOME HEALTH SERVICES | Facility: HOME HEALTH | Age: 74
End: 2024-06-25
Payer: MEDICARE

## 2024-06-25 LAB
ANION GAP SERPL CALC-SCNC: 12 MMOL/L
BUN SERPL-MCNC: 28 MG/DL (ref 8–25)
CALCIUM SERPL-MCNC: 9 MG/DL (ref 8.5–10.4)
CHLORIDE SERPL-SCNC: 97 MMOL/L (ref 97–107)
CO2 SERPL-SCNC: 24 MMOL/L (ref 24–31)
CREAT SERPL-MCNC: 1.2 MG/DL (ref 0.4–1.6)
EGFRCR SERPLBLD CKD-EPI 2021: 48 ML/MIN/1.73M*2
ERYTHROCYTE [DISTWIDTH] IN BLOOD BY AUTOMATED COUNT: 11.9 % (ref 11.5–14.5)
GLUCOSE SERPL-MCNC: 97 MG/DL (ref 65–99)
HCT VFR BLD AUTO: 32 % (ref 36–46)
HGB BLD-MCNC: 10.6 G/DL (ref 12–16)
MCH RBC QN AUTO: 33.4 PG (ref 26–34)
MCHC RBC AUTO-ENTMCNC: 33.1 G/DL (ref 32–36)
MCV RBC AUTO: 101 FL (ref 80–100)
NRBC BLD-RTO: 0 /100 WBCS (ref 0–0)
PLATELET # BLD AUTO: 254 X10*3/UL (ref 150–450)
POTASSIUM SERPL-SCNC: 3.5 MMOL/L (ref 3.4–5.1)
RBC # BLD AUTO: 3.17 X10*6/UL (ref 4–5.2)
SODIUM SERPL-SCNC: 133 MMOL/L (ref 133–145)
WBC # BLD AUTO: 11.3 X10*3/UL (ref 4.4–11.3)

## 2024-06-25 PROCEDURE — 97535 SELF CARE MNGMENT TRAINING: CPT | Mod: GO

## 2024-06-25 PROCEDURE — 2500000001 HC RX 250 WO HCPCS SELF ADMINISTERED DRUGS (ALT 637 FOR MEDICARE OP): Performed by: INTERNAL MEDICINE

## 2024-06-25 PROCEDURE — RXMED WILLOW AMBULATORY MEDICATION CHARGE

## 2024-06-25 PROCEDURE — 80048 BASIC METABOLIC PNL TOTAL CA: CPT | Performed by: ORTHOPAEDIC SURGERY

## 2024-06-25 PROCEDURE — 97530 THERAPEUTIC ACTIVITIES: CPT | Mod: GO

## 2024-06-25 PROCEDURE — 7100000011 HC EXTENDED STAY RECOVERY HOURLY - NURSING UNIT

## 2024-06-25 PROCEDURE — 2500000004 HC RX 250 GENERAL PHARMACY W/ HCPCS (ALT 636 FOR OP/ED): Mod: JZ | Performed by: ORTHOPAEDIC SURGERY

## 2024-06-25 PROCEDURE — 2500000001 HC RX 250 WO HCPCS SELF ADMINISTERED DRUGS (ALT 637 FOR MEDICARE OP): Performed by: ORTHOPAEDIC SURGERY

## 2024-06-25 PROCEDURE — 97110 THERAPEUTIC EXERCISES: CPT | Mod: GP | Performed by: PHYSICAL THERAPIST

## 2024-06-25 PROCEDURE — 85027 COMPLETE CBC AUTOMATED: CPT | Performed by: ORTHOPAEDIC SURGERY

## 2024-06-25 PROCEDURE — 97530 THERAPEUTIC ACTIVITIES: CPT | Mod: GP | Performed by: PHYSICAL THERAPIST

## 2024-06-25 PROCEDURE — 36415 COLL VENOUS BLD VENIPUNCTURE: CPT | Performed by: ORTHOPAEDIC SURGERY

## 2024-06-25 RX ORDER — OXYCODONE HYDROCHLORIDE 5 MG/1
5 TABLET ORAL EVERY 4 HOURS PRN
Qty: 40 TABLET | Refills: 0 | Status: SHIPPED | OUTPATIENT
Start: 2024-06-25 | End: 2024-07-03

## 2024-06-25 RX ORDER — ASPIRIN 81 MG/1
81 TABLET ORAL 2 TIMES DAILY
Qty: 56 TABLET | Refills: 0 | Status: SHIPPED | OUTPATIENT
Start: 2024-06-25 | End: 2024-07-24

## 2024-06-25 RX ORDER — ACETAMINOPHEN 500 MG
1000 TABLET ORAL EVERY 6 HOURS PRN
Qty: 120 TABLET | Refills: 0 | Status: SHIPPED | OUTPATIENT
Start: 2024-06-25 | End: 2024-07-11

## 2024-06-25 RX ORDER — DOCUSATE SODIUM 100 MG/1
100 CAPSULE, LIQUID FILLED ORAL 2 TIMES DAILY
Qty: 30 CAPSULE | Refills: 0 | Status: SHIPPED | OUTPATIENT
Start: 2024-06-25 | End: 2024-07-11

## 2024-06-25 RX ORDER — IBUPROFEN 600 MG/1
600 TABLET ORAL EVERY 6 HOURS PRN
Qty: 20 TABLET | Refills: 0 | Status: SHIPPED | OUTPATIENT
Start: 2024-06-25 | End: 2024-07-01

## 2024-06-25 SDOH — ECONOMIC STABILITY: HOUSING INSECURITY
IN THE LAST 12 MONTHS, WAS THERE A TIME WHEN YOU DID NOT HAVE A STEADY PLACE TO SLEEP OR SLEPT IN A SHELTER (INCLUDING NOW)?: NO

## 2024-06-25 SDOH — ECONOMIC STABILITY: INCOME INSECURITY: IN THE PAST 12 MONTHS, HAS THE ELECTRIC, GAS, OIL, OR WATER COMPANY THREATENED TO SHUT OFF SERVICE IN YOUR HOME?: NO

## 2024-06-25 SDOH — HEALTH STABILITY: MENTAL HEALTH: HOW OFTEN DO YOU HAVE A DRINK CONTAINING ALCOHOL?: NEVER

## 2024-06-25 SDOH — HEALTH STABILITY: MENTAL HEALTH: HOW OFTEN DO YOU HAVE 6 OR MORE DRINKS ON ONE OCCASION?: NEVER

## 2024-06-25 SDOH — SOCIAL STABILITY: SOCIAL NETWORK: HOW OFTEN DO YOU GET TOGETHER WITH FRIENDS OR RELATIVES?: MORE THAN THREE TIMES A WEEK

## 2024-06-25 SDOH — ECONOMIC STABILITY: FOOD INSECURITY: WITHIN THE PAST 12 MONTHS, YOU WORRIED THAT YOUR FOOD WOULD RUN OUT BEFORE YOU GOT MONEY TO BUY MORE.: NEVER TRUE

## 2024-06-25 SDOH — HEALTH STABILITY: MENTAL HEALTH
STRESS IS WHEN SOMEONE FEELS TENSE, NERVOUS, ANXIOUS, OR CAN'T SLEEP AT NIGHT BECAUSE THEIR MIND IS TROUBLED. HOW STRESSED ARE YOU?: ONLY A LITTLE

## 2024-06-25 SDOH — ECONOMIC STABILITY: TRANSPORTATION INSECURITY
IN THE PAST 12 MONTHS, HAS LACK OF TRANSPORTATION KEPT YOU FROM MEETINGS, WORK, OR FROM GETTING THINGS NEEDED FOR DAILY LIVING?: NO

## 2024-06-25 SDOH — SOCIAL STABILITY: SOCIAL INSECURITY: WITHIN THE LAST YEAR, HAVE YOU BEEN AFRAID OF YOUR PARTNER OR EX-PARTNER?: NO

## 2024-06-25 SDOH — SOCIAL STABILITY: SOCIAL NETWORK: ARE YOU MARRIED, WIDOWED, DIVORCED, SEPARATED, NEVER MARRIED, OR LIVING WITH A PARTNER?: MARRIED

## 2024-06-25 SDOH — HEALTH STABILITY: MENTAL HEALTH
HOW OFTEN DO YOU NEED TO HAVE SOMEONE HELP YOU WHEN YOU READ INSTRUCTIONS, PAMPHLETS, OR OTHER WRITTEN MATERIAL FROM YOUR DOCTOR OR PHARMACY?: NEVER

## 2024-06-25 SDOH — SOCIAL STABILITY: SOCIAL INSECURITY
WITHIN THE LAST YEAR, HAVE YOU BEEN KICKED, HIT, SLAPPED, OR OTHERWISE PHYSICALLY HURT BY YOUR PARTNER OR EX-PARTNER?: NO

## 2024-06-25 SDOH — ECONOMIC STABILITY: FOOD INSECURITY: WITHIN THE PAST 12 MONTHS, THE FOOD YOU BOUGHT JUST DIDN'T LAST AND YOU DIDN'T HAVE MONEY TO GET MORE.: NEVER TRUE

## 2024-06-25 SDOH — ECONOMIC STABILITY: TRANSPORTATION INSECURITY
IN THE PAST 12 MONTHS, HAS THE LACK OF TRANSPORTATION KEPT YOU FROM MEDICAL APPOINTMENTS OR FROM GETTING MEDICATIONS?: NO

## 2024-06-25 SDOH — ECONOMIC STABILITY: INCOME INSECURITY: IN THE LAST 12 MONTHS, WAS THERE A TIME WHEN YOU WERE NOT ABLE TO PAY THE MORTGAGE OR RENT ON TIME?: NO

## 2024-06-25 SDOH — HEALTH STABILITY: MENTAL HEALTH: HOW MANY STANDARD DRINKS CONTAINING ALCOHOL DO YOU HAVE ON A TYPICAL DAY?: PATIENT DOES NOT DRINK

## 2024-06-25 SDOH — ECONOMIC STABILITY: HOUSING INSECURITY: IN THE LAST 12 MONTHS, HOW MANY PLACES HAVE YOU LIVED?: 1

## 2024-06-25 SDOH — SOCIAL STABILITY: SOCIAL INSECURITY: WITHIN THE LAST YEAR, HAVE YOU BEEN HUMILIATED OR EMOTIONALLY ABUSED IN OTHER WAYS BY YOUR PARTNER OR EX-PARTNER?: NO

## 2024-06-25 SDOH — SOCIAL STABILITY: SOCIAL INSECURITY
WITHIN THE LAST YEAR, HAVE TO BEEN RAPED OR FORCED TO HAVE ANY KIND OF SEXUAL ACTIVITY BY YOUR PARTNER OR EX-PARTNER?: NO

## 2024-06-25 SDOH — SOCIAL STABILITY: SOCIAL NETWORK
IN A TYPICAL WEEK, HOW MANY TIMES DO YOU TALK ON THE PHONE WITH FAMILY, FRIENDS, OR NEIGHBORS?: MORE THAN THREE TIMES A WEEK

## 2024-06-25 SDOH — ECONOMIC STABILITY: INCOME INSECURITY: HOW HARD IS IT FOR YOU TO PAY FOR THE VERY BASICS LIKE FOOD, HOUSING, MEDICAL CARE, AND HEATING?: NOT HARD AT ALL

## 2024-06-25 ASSESSMENT — PAIN SCALES - GENERAL
PAINLEVEL_OUTOF10: 7
PAINLEVEL_OUTOF10: 4
PAINLEVEL_OUTOF10: 7
PAINLEVEL_OUTOF10: 7
PAINLEVEL_OUTOF10: 2
PAINLEVEL_OUTOF10: 5 - MODERATE PAIN
PAINLEVEL_OUTOF10: 5 - MODERATE PAIN
PAINLEVEL_OUTOF10: 7
PAINLEVEL_OUTOF10: 0 - NO PAIN
PAINLEVEL_OUTOF10: 6
PAINLEVEL_OUTOF10: 7
PAINLEVEL_OUTOF10: 7
PAINLEVEL_OUTOF10: 8
PAINLEVEL_OUTOF10: 4
PAINLEVEL_OUTOF10: 10 - WORST POSSIBLE PAIN
PAINLEVEL_OUTOF10: 2

## 2024-06-25 ASSESSMENT — COGNITIVE AND FUNCTIONAL STATUS - GENERAL
DAILY ACTIVITIY SCORE: 20
MOBILITY SCORE: 18
STANDING UP FROM CHAIR USING ARMS: A LITTLE
DAILY ACTIVITIY SCORE: 17
DRESSING REGULAR UPPER BODY CLOTHING: A LITTLE
MOVING TO AND FROM BED TO CHAIR: A LITTLE
CLIMB 3 TO 5 STEPS WITH RAILING: A LITTLE
TURNING FROM BACK TO SIDE WHILE IN FLAT BAD: A LITTLE
PERSONAL GROOMING: A LITTLE
MOBILITY SCORE: 16
STANDING UP FROM CHAIR USING ARMS: A LITTLE
TOILETING: A LITTLE
DRESSING REGULAR UPPER BODY CLOTHING: A LITTLE
STANDING UP FROM CHAIR USING ARMS: A LITTLE
HELP NEEDED FOR BATHING: A LITTLE
STANDING UP FROM CHAIR USING ARMS: A LITTLE
TURNING FROM BACK TO SIDE WHILE IN FLAT BAD: A LITTLE
TURNING FROM BACK TO SIDE WHILE IN FLAT BAD: A LITTLE
HELP NEEDED FOR BATHING: A LOT
MOVING TO AND FROM BED TO CHAIR: A LITTLE
MOBILITY SCORE: 18
CLIMB 3 TO 5 STEPS WITH RAILING: A LITTLE
DRESSING REGULAR LOWER BODY CLOTHING: A LITTLE
DRESSING REGULAR LOWER BODY CLOTHING: A LOT
MOVING TO AND FROM BED TO CHAIR: A LITTLE
WALKING IN HOSPITAL ROOM: A LITTLE
TOILETING: A LITTLE
MOVING TO AND FROM BED TO CHAIR: A LITTLE
DRESSING REGULAR UPPER BODY CLOTHING: A LITTLE
DRESSING REGULAR LOWER BODY CLOTHING: A LITTLE
MOVING FROM LYING ON BACK TO SITTING ON SIDE OF FLAT BED WITH BEDRAILS: A LITTLE
MOVING FROM LYING ON BACK TO SITTING ON SIDE OF FLAT BED WITH BEDRAILS: A LITTLE
WALKING IN HOSPITAL ROOM: A LITTLE
WALKING IN HOSPITAL ROOM: A LITTLE
CLIMB 3 TO 5 STEPS WITH RAILING: TOTAL
TURNING FROM BACK TO SIDE WHILE IN FLAT BAD: A LITTLE
MOVING FROM LYING ON BACK TO SITTING ON SIDE OF FLAT BED WITH BEDRAILS: A LITTLE
WALKING IN HOSPITAL ROOM: A LITTLE
CLIMB 3 TO 5 STEPS WITH RAILING: A LITTLE
PERSONAL GROOMING: A LITTLE
DAILY ACTIVITIY SCORE: 19
TOILETING: A LITTLE
MOBILITY SCORE: 18
HELP NEEDED FOR BATHING: A LITTLE
MOVING FROM LYING ON BACK TO SITTING ON SIDE OF FLAT BED WITH BEDRAILS: A LITTLE

## 2024-06-25 ASSESSMENT — PAIN DESCRIPTION - LOCATION
LOCATION: KNEE

## 2024-06-25 ASSESSMENT — PAIN DESCRIPTION - ORIENTATION
ORIENTATION: LEFT

## 2024-06-25 ASSESSMENT — ACTIVITIES OF DAILY LIVING (ADL)
HOME_MANAGEMENT_TIME_ENTRY: 13
LACK_OF_TRANSPORTATION: NO

## 2024-06-25 ASSESSMENT — PAIN - FUNCTIONAL ASSESSMENT
PAIN_FUNCTIONAL_ASSESSMENT: 0-10

## 2024-06-25 ASSESSMENT — LIFESTYLE VARIABLES
AUDIT-C TOTAL SCORE: 0
SKIP TO QUESTIONS 9-10: 1

## 2024-06-25 ASSESSMENT — PAIN DESCRIPTION - DESCRIPTORS
DESCRIPTORS: PRESSURE;SORE;THROBBING
DESCRIPTORS: THROBBING

## 2024-06-25 NOTE — DISCHARGE SUMMARY
Discharge Diagnosis  Arthritis of left knee    Issues Requiring Follow-Up  Left total knee replacement    Test Results Pending At Discharge  Pending Labs       No current pending labs.            Hospital Course   Patient brought to the operating room day of admission for left total knee replacement.  Uncomplicated surgery admitted to regular nursing floor.  Pain better postoperative day 2.  Plan for home with home health care.  Pertinent Physical Exam At Time of Discharge  Physical Exam    Home Medications     Medication List      START taking these medications     aspirin 81 mg EC tablet; Take 1 tablet (81 mg) by mouth 2 times a day   for 28 days.   docusate sodium 100 mg capsule; Commonly known as: Colace; Take 1   capsule (100 mg) by mouth 2 times a day for 15 days.   ibuprofen 600 mg tablet; Take 1 tablet (600 mg) by mouth every 6 hours   if needed for mild pain (1 - 3) for up to 5 days.   oxyCODONE 5 mg immediate release tablet; Commonly known as: Roxicodone;   Take 1 tablet (5 mg) by mouth every 4 hours if needed for severe pain (7 -   10) for up to 7 days.     CHANGE how you take these medications     * acetaminophen 500 mg tablet; Commonly known as: Tylenol; TAKE 1 TABLET   BY MOUTH EVERY 4 HOURS; What changed: Another medication with the same   name was added. Make sure you understand how and when to take each.   * acetaminophen 500 mg tablet; Commonly known as: Tylenol; Take 2   tablets (1,000 mg) by mouth every 6 hours if needed for mild pain (1 - 3)   for up to 15 days.; What changed: You were already taking a medication   with the same name, and this prescription was added. Make sure you   understand how and when to take each.   amLODIPine 2.5 mg tablet; Commonly known as: Norvasc; Take 1 tablet (2.5   mg) by mouth once daily.; What changed: when to take this, reasons to take   this  * This list has 2 medication(s) that are the same as other medications   prescribed for you. Read the directions  carefully, and ask your doctor or   other care provider to review them with you.     CONTINUE taking these medications     albuterol 90 mcg/actuation inhaler   atorvastatin 20 mg tablet; Commonly known as: Lipitor   carBAMazepine 200 mg tablet; Commonly known as: TEGretol   Fioricet -40 mg capsule; Generic drug:   butalbital-acetaminophen-caff   triamterene-hydrochlorothiazid 75-50 mg tablet; Commonly known as:   Maxzide     STOP taking these medications     chlorhexidine 0.12 % solution; Commonly known as: Peridex   MELOXICAM ORAL       Outpatient Follow-Up  Future Appointments   Date Time Provider Department Center   7/15/2024 10:00 AM Leslie Pritchard, PAM [id number]       Joe Katz MD

## 2024-06-25 NOTE — PROGRESS NOTES
[facility] gave ok for referral to [facility]. Pt anticipated to dc on 6/26. Pts PCP is Dr Oscar Santizo, prescriptions through [facility] in [place] though she will be utilizing meds to beds.      06/25/24 1134   Discharge Planning   Living Arrangements Spouse/significant other   Support Systems Spouse/significant other   Type of Residence Private residence   Who is requesting discharge planning? Provider   Home or Post Acute Services In home services   Type of Home Care Services Home PT   Patient expects to be discharged to: Home with [facility]   Financial Resource Strain   How hard is it for you to pay for the very basics like food, housing, medical care, and heating? Not hard   Housing Stability   In the last 12 months, was there a time when you were not able to pay the mortgage or rent on time? N   In the last 12 months, how many places have you lived? 1   In the last 12 months, was there a time when you did not have a steady place to sleep or slept in a shelter (including now)? N   Transportation Needs   In the past 12 months, has lack of transportation kept you from medical appointments or from getting medications? no   In the past 12 months, has lack of transportation kept you from meetings, work, or from getting things needed for daily living? No

## 2024-06-25 NOTE — PROGRESS NOTES
Physical Therapy    Physical Therapy Treatment    Patient Name: Peggy Menjivar  MRN: [id number]  Today's Date: 6/25/2024  Time Calculation  Start Time: 1112  Stop Time: 1130  Time Calculation (min): 18 min    Assessment/Plan   PT Assessment  PT Assessment Results: Decreased strength, Decreased range of motion, Decreased endurance, Impaired balance, Decreased mobility, Pain  Rehab Prognosis: Good  Strengths: Support and attitude of living partners, Premorbid level of function, Access to adaptive/assistive products  Barriers to Participation: Comorbidities  Assessment Comment: Pt made slower than anticipated progress toward her functional mobility goals. Pt would benefit from continued skilled PT services for maximizing independence and safety prior to & after discharge (low intensity).  End of Session Patient Position: Bed, 3 rail up, Alarm off, not on at start of session (call light and needs within reach; spouse in room)     PT Plan  Treatment/Interventions: Bed mobility, Transfer training, Gait training, Stair training, Balance training, Strengthening, Range of motion, Therapeutic exercise, Therapeutic activity  PT Plan: Ongoing PT  PT Frequency: BID  PT Discharge Recommendations: Low intensity level of continued care  PT Recommended Transfer Status: Assist x1 (FWW)  PT - OK to Discharge: Yes      General Visit Information:   PT  Visit  PT Received On: 06/25/24  General  Reason for Referral: Impaired functional mobility. This 73 year old female was admitted for an elective surgery. She was now s/p L TKA on 6/24/24 by Dr. Katz.  Past Medical History Relevant to Rehab: breast cancer, HTN, OA, seizure, spinal stenosis, juvenal, R TKA  Family/Caregiver Present: Yes  Caregiver Feedback: Spouse at bedside.  Prior to Session Communication: Bedside nurse  Patient Position Received: Bed, 3 rail up, Alarm off, not on at start of session  General Comment: Pt agreed to session and participated as able.    Subjective    Precautions:  Precautions  Hearing/Visual Limitations:  (Hearing WFL; wears glasses)  LE Weight Bearing Status: Weight Bearing as Tolerated (LLE)  Medical Precautions: Fall precautions  Post-Surgical Precautions: Left total knee precautions  Precautions Comment: Pt unable to state any precautions prior to education.    Objective   Pain:  Pain Assessment  Pain Assessment: 0-10  0-10 (Numeric) Pain Score: 5 - Moderate pain  Pain Type: Surgical pain  Pain Location: Knee  Pain Orientation: Left  Pain Interventions: Cold applied     Activity Tolerance:  Activity Tolerance  Endurance: Tolerates 10 - 20 min exercise with multiple rests  Treatments:  Therapeutic Exercise  In supine, x20 each. Cued as needed for optimal form to obtain maximal benefits.   -bilat AP   -bilat QS   -L knee flex   -L SAQ    Outcome Measures:  [facility] Basic Mobility  Turning from your back to your side while in a flat bed without using bedrails: A little  Moving from lying on your back to sitting on the side of a flat bed without using bedrails: A little  Moving to and from bed to chair (including a wheelchair): A little  Standing up from a chair using your arms (e.g. wheelchair or bedside chair): A little  To walk in hospital room: A little  Climbing 3-5 steps with railing: A little  Basic Mobility - Total Score: 18    Education Documentation  Precautions, taught by Zaria Morse PT at 6/25/2024 11:35 AM.  Learner: Patient  Readiness: Acceptance  Method: Explanation  Response: Verbalizes Understanding, Demonstrated Understanding, Needs Reinforcement    Home Exercise Program, taught by Zaria Morse PT at 6/25/2024 11:35 AM.  Learner: Patient  Readiness: Acceptance  Method: Explanation  Response: Verbalizes Understanding, Demonstrated Understanding, Needs Reinforcement    Mobility Training, taught by Zaria Morse PT at 6/25/2024 11:35 AM.  Learner: Patient  Readiness: Acceptance  Method: Explanation  Response: Verbalizes Understanding,  Demonstrated Understanding, Needs Reinforcement    Education Comments  No comments found.        OP EDUCATION:       Encounter Problems       Encounter Problems (Active)       Functional Mobility       Pt will transition supine<>sit with mod I. (Progressing)       Start:  06/24/24    Expected End:  07/21/24            Pt will transfer sit<>stand with FWW & mod I. (Progressing)       Start:  06/24/24    Expected End:  07/21/24            Pt will ambulate >/=150 ft with FWW & mod I. (Progressing)       Start:  06/24/24    Expected End:  07/21/24            Pt will negotiate 5 stairs with unilateral rail and mod I. (Not Progressing)       Start:  06/24/24    Expected End:  07/21/24

## 2024-06-25 NOTE — NURSING NOTE
Patient awake and alert, resting quietly in bed.  Respirations even and unlabored on RA.  IVF infusing as ordered.  Offers no complaints of pain/discomfort at this time.  Routine tylenol given.  Discussed plan of care with patient and that after PT, surgical drain will be removed.   Education provided on incentive spirometry and patient is using as tolerated.  Breakfast tray has been ordered.

## 2024-06-25 NOTE — PROGRESS NOTES
Occupational Therapy    OT Treatment    Patient Name: Peggy Menjivar  MRN: [id number]  Today's Date: 6/25/2024  Time Calculation  Start Time: 0903  Stop Time: 0926  Time Calculation (min): 23 min         Assessment:  OT Assessment: Pt is demonstrating minimal functional progress towards her established goals, but is limited by her increased LLE pain. Pt would benefit from 1 more overnight stay in the hospital for pain management. Continue POC.  Prognosis: Good  Evaluation/Treatment Tolerance: Patient limited by pain  End of Session Communication: Bedside nurse  End of Session Patient Position: Up in chair, Alarm on (Needs in reach.)  OT Assessment Results: Decreased ADL status, Decreased endurance, Decreased functional mobility, Decreased IADLs    Plan:  Treatment Interventions: ADL retraining, Functional transfer training, Endurance training, Patient/family training, Equipment evaluation/education, Neuromuscular reeducation, Compensatory technique education  OT Frequency: 5 times per week  OT Discharge Recommendations: Low intensity level of continued care  Equipment Recommended upon Discharge: Wheeled walker  OT Recommended Transfer Status: Minimal assist, Assist of 1  OT - OK to Discharge: Yes      Subjective     Previous Visit Info:  OT Last Visit  OT Received On: 06/25/24    General:  General  Reason for Referral: s/p L TKR, impaired ADL's/mobility  Referred By: Joe Katz MD  Past Medical History Relevant to Rehab: breast CA, HTN, OA, seizure, spinal stenosis, juvenal, R TKR, hyst  Family/Caregiver Present: Yes  Caregiver Feedback: Spouse at bedside.  Prior to Session Communication: Bedside nurse  Patient Position Received: Bed, 3 rail up, Alarm on  General Comment: Cleared by nurse prior to session and pt agreeable to OT treatment.    Precautions:  Hearing/Visual Limitations: wears glasses  LE Weight Bearing Status: Weight Bearing as Tolerated (LLE)  Medical Precautions: Fall precautions  Post-Surgical  Precautions: Left total knee precautions    Pain:  Pain Assessment  Pain Assessment: 0-10  0-10 (Numeric) Pain Score: 10 - Worst possible pain  Pain Type: Surgical pain, Acute pain  Pain Location: Knee  Pain Orientation: Left  Pain Interventions: Ambulation/increased activity, Other (Comment) (Notified nurse.)    Objective      Activities of Daily Living: Grooming  Grooming Level of Assistance: Setup  Grooming Where Assessed: Chair  Grooming Comments: Pt completed brushing her teeth with setup only once supplies provided beforehand.    Toileting  Toileting Level of Assistance: Minimum assistance, Minimal verbal cues  Where Assessed: Toilet  Toileting Comments: Pt required steadying assist in standing during clothing management, but able to complete pericare seated on toilet independently.    Bed Mobility/Transfers: Bed Mobility  Bed Mobility: Yes  Bed Mobility 1  Bed Mobility 1: Supine to sitting  Level of Assistance 1: Close supervision, Minimal verbal cues  Bed Mobility Comments 1: Pt completed with supervision for safety and increased time to complete due to increased L knee pain.    Transfers  Transfer: Yes  Transfer 1  Transfer From 1: Bed to  Transfer to 1: Stand  Technique 1: Sit to stand  Transfer Device 1: Walker  Transfer Level of Assistance 1: Minimum assistance, Minimal verbal cues  Trials/Comments 1: Pt required steadying assist for balance and verbal cues for hand placement.  Transfers 2  Transfer From 2: Stand to  Transfer to 2: Chair with arms  Technique 2: Stand to sit  Transfer Device 2: Walker  Transfer Level of Assistance 2: Minimum assistance, Minimal verbal cues  Trials/Comments 2: Pt required steadying assist for balance and verbal cues for hand placement.    Toilet Transfers  Toilet Transfer From: Walker  Toilet Transfer Type: To and from  Toilet Transfer to: Standard toilet  Toilet Transfer Technique: Ambulating  Toilet Transfers: Minimal assistance, Verbal cues  Toilet Transfers Comments:  Steadying assist for balance and verbal cues for sequencing and hand placement.  Car Transfers  Car Transfers Comments: Reviewed proper technique with pt and spouse.    Functional Mobility:  Functional Mobility  Functional Mobility Performed: Yes  Functional Mobility 1  Device 1: Rolling walker  Assistance 1: Minimum assistance, Minimal verbal cues  Comments 1: Pt tolerated functional mobility to/from the bathroom using a RW with min A for balance/safety and pt reporting increased LLE pain with weight bearing. Verbal cues also provided for walker sequencing.      Outcome Measures:[facility] Daily Activity  Putting on and taking off regular lower body clothing: A lot  Bathing (including washing, rinsing, drying): A lot  Putting on and taking off regular upper body clothing: A little  Toileting, which includes using toilet, bedpan or urinal: A little  Taking care of personal grooming such as brushing teeth: A little  Eating Meals: None  Daily Activity - Total Score: 17      Education Documentation  Handouts, taught by Ye Robert Jr. OT at 6/24/2024  2:19 PM.  Learner: Patient  Readiness: Acceptance  Method: Explanation, Handout, Demonstration  Response: Verbalizes Understanding    Precautions, taught by Ye Robert Jr. OT at 6/24/2024  2:19 PM.  Learner: Patient  Readiness: Acceptance  Method: Explanation, Handout, Demonstration  Response: Verbalizes Understanding    ADL Training, taught by Ye Robert Jr. OT at 6/24/2024  2:19 PM.  Learner: Patient  Readiness: Acceptance  Method: Explanation, Handout, Demonstration  Response: Verbalizes Understanding    Education Comments  No comments found.      Goals:  Encounter Problems       Encounter Problems (Active)       OT Goals       Pt will complete all functional transfers and mobility with mod indep using a RW. (Progressing)       Start:  06/24/24    Expected End:  07/29/24            Pt will tolerate functional mobility for a household distance using a RW with  mod indep. (Progressing)       Start:  06/24/24    Expected End:  07/29/24            Pt will complete all ADL's with mod indep/indep using a device as needed. (Progressing)       Start:  06/24/24    Expected End:  07/29/24

## 2024-06-25 NOTE — PROGRESS NOTES
Physical Therapy    Physical Therapy Treatment    Patient Name: Peggy Menjivar  MRN: [id number]  Today's Date: 6/25/2024  Time Calculation  Start Time: 1455  Stop Time: 1518  Time Calculation (min): 23 min    Assessment/Plan   PT Assessment  Assessment Comment: Pt made adeqaute progress toward her functional mobility goals. Pt would benefit from continued skilled PT services for maximizing independence and safety prior to & after discharge (low intensity).  End of Session Patient Position: Up in chair, Alarm on (call light and needs within reach; spouse in room)     PT Plan  Treatment/Interventions: Bed mobility, Transfer training, Gait training, Stair training, Balance training, Strengthening, Range of motion, Therapeutic exercise, Therapeutic activity  PT Plan: Ongoing PT  PT Frequency: BID  PT Discharge Recommendations: Low intensity level of continued care  PT Recommended Transfer Status: Assist x1 (FWW)  PT - OK to Discharge: Yes      General Visit Information:   PT  Visit  PT Received On: 06/25/24  General  Family/Caregiver Present: Yes  Caregiver Feedback: Spouse at bedside.  Prior to Session Communication: Bedside nurse  Patient Position Received: Bed, 3 rail up, Alarm off, not on at start of session  General Comment: Pt agreed to session and participated fully.    Subjective   Precautions:  Precautions  LE Weight Bearing Status: Weight Bearing as Tolerated (LLE)  Medical Precautions: Fall precautions  Post-Surgical Precautions: Left hip precautions  Precautions Comment: Pt stated 1/3 precautions prior to education.    Objective   Pain:  Pain Assessment: 0-10  0-10 (Numeric) Pain Score: 4  Pain Type: Surgical pain  Pain Location: Knee  Pain Orientation: Left  Pain Interventions: Ambulation/increased activity, Cold applied     Activity Tolerance:  Endurance: Tolerates 10 - 20 min exercise with multiple rests    Treatments:  Therapeutic Exercise  Cued as needed for proper form to obtain maximal benefits.     -Supine       -bilat AP       -bilat QS        -L knee flex       -L SAQ     -Seated        -L LAQ        -L knee flex    Bed Mobility  Bed Mobility 1: Supine to sitting  Level of Assistance 1: Close supervision  Bed Mobility Comments 1: HOB elevated, toward L side    Transfers  Technique 1: Sit to stand, Stand to sit  Transfer Device 1: Walker  Transfer Level of Assistance 1: Close supervision  Trials/Comments 1: x1 trial at EOB or bedside chair    Ambulation/Gait Training  Surface 1: Level tile  Device 1: Rolling walker  Assistance 1: Close supervision  Comments/Distance (ft) 1: 30 ft with step through pattern and slowed velocity. Steady gait.      Outcome Measures:  [facility] Basic Mobility  Turning from your back to your side while in a flat bed without using bedrails: A little  Moving from lying on your back to sitting on the side of a flat bed without using bedrails: A little  Moving to and from bed to chair (including a wheelchair): A little  Standing up from a chair using your arms (e.g. wheelchair or bedside chair): A little  To walk in hospital room: A little  Climbing 3-5 steps with railing: A little  Basic Mobility - Total Score: 18    Education Documentation  Precautions, taught by Zaria Morse PT at 6/25/2024  3:29 PM.  Learner: Patient  Readiness: Acceptance  Method: Explanation  Response: Verbalizes Understanding, Demonstrated Understanding, Needs Reinforcement    Home Exercise Program, taught by Zaria Morse PT at 6/25/2024  3:29 PM.  Learner: Patient  Readiness: Acceptance  Method: Explanation  Response: Verbalizes Understanding, Demonstrated Understanding, Needs Reinforcement    Mobility Training, taught by Zaria Morse PT at 6/25/2024  3:29 PM.  Learner: Patient  Readiness: Acceptance  Method: Explanation  Response: Verbalizes Understanding, Demonstrated Understanding, Needs Reinforcement    Precautions, taught by Zaria Morse PT at 6/25/2024 11:35 AM.  Learner: Patient  Readiness:  Acceptance  Method: Explanation  Response: Verbalizes Understanding, Demonstrated Understanding, Needs Reinforcement    Home Exercise Program, taught by Zaria Morse, PT at 6/25/2024 11:35 AM.  Learner: Patient  Readiness: Acceptance  Method: Explanation  Response: Verbalizes Understanding, Demonstrated Understanding, Needs Reinforcement    Mobility Training, taught by Zaria Morse, PT at 6/25/2024 11:35 AM.  Learner: Patient  Readiness: Acceptance  Method: Explanation  Response: Verbalizes Understanding, Demonstrated Understanding, Needs Reinforcement    Education Comments  No comments found.        OP EDUCATION:       Encounter Problems       Encounter Problems (Active)       Functional Mobility       Pt will transition supine<>sit with mod I. (Progressing)       Start:  06/24/24    Expected End:  07/21/24            Pt will transfer sit<>stand with FWW & mod I. (Progressing)       Start:  06/24/24    Expected End:  07/21/24            Pt will ambulate >/=150 ft with FWW & mod I. (Progressing)       Start:  06/24/24    Expected End:  07/21/24            Pt will negotiate 5 stairs with unilateral rail and mod I. (Not Progressing)       Start:  06/24/24    Expected End:  07/21/24               Pain - Adult

## 2024-06-25 NOTE — NURSING NOTE
[place] taken out at this time. Pt tolerated well. Pt assisted to side of bed and stood up with walker.

## 2024-06-25 NOTE — PROGRESS NOTES
"Peggy Menjivar is a 73 y.o. female on day 0 of admission presenting with Arthritis of left knee.    Subjective   In bed.  Complains of pain in the knee that leads to nausea when she moves.  Does not think that she can get into a car today.  Otherwise she is feeling well.  No nausea when resting.  Tolerating p.o. intake was able to keep her breakfast down.  Pain otherwise controlled at rest.  Denies chest pain or shortness of breath.  I discussed with nursing staff and they noted she did well with therapy and walked with assistance of a walker.       Objective     Physical Exam  Alert and oriented x 3   at the bedside  Left lower extremity: Drain output noted.  Dressing dry.  She is able to straight leg raise while in bed.  Active ankle dorsiflexion plantarflexion intact.  Compartments are soft throughout.  Neurovascular intact otherwise.  Last Recorded Vitals  Blood pressure 147/66, pulse 73, temperature 36.7 °C (98.1 °F), temperature source Oral, resp. rate 16, height 1.553 m (5' 1.14\"), weight 106 kg (234 lb 9.1 oz), SpO2 97%.      Relevant Results  Postoperative imaging of the left total knee reviewed.  Stable position of cemented total knee replacement.    Results for orders placed or performed during the hospital encounter of 06/24/24 (from the past 24 hour(s))   CBC   Result Value Ref Range    WBC 11.3 4.4 - 11.3 x10*3/uL    nRBC 0.0 0.0 - 0.0 /100 WBCs    RBC 3.17 (L) 4.00 - 5.20 x10*6/uL    Hemoglobin 10.6 (L) 12.0 - 16.0 g/dL    Hematocrit 32.0 (L) 36.0 - 46.0 %     (H) 80 - 100 fL    MCH 33.4 26.0 - 34.0 pg    MCHC 33.1 32.0 - 36.0 g/dL    RDW 11.9 11.5 - 14.5 %    Platelets 254 150 - 450 x10*3/uL   Basic metabolic panel   Result Value Ref Range    Glucose 97 65 - 99 mg/dL    Sodium 133 133 - 145 mmol/L    Potassium 3.5 3.4 - 5.1 mmol/L    Chloride 97 97 - 107 mmol/L    Bicarbonate 24 24 - 31 mmol/L    Urea Nitrogen 28 (H) 8 - 25 mg/dL    Creatinine 1.20 0.40 - 1.60 mg/dL    eGFR 48 (L) " >60 mL/min/1.73m*2    Calcium 9.0 8.5 - 10.4 mg/dL    Anion Gap 12 <=19 mmol/L       Assessment/Plan   Principal Problem:    Arthritis of left knee  Active Problems:    HTN (hypertension)    Hyperlipidemia    Class 3 severe obesity in adult (Multi)    Seizures (Multi)      Problem List:  Left total knee replacement: Postoperative day 1.  Continue mobilization weightbearing as tolerated PT.  Likely discharge tomorrow morning after further PT.  Continue pain management protocol.  VTE risk: Aspirin twice daily.        Joe Katz MD

## 2024-06-25 NOTE — PROGRESS NOTES
Peggy Menjivar is a 73 y.o. female on day 0 of admission presenting with Arthritis of left knee.      Subjective   Patient examined sitting up in bed  at bedside.  She states that the pain in her knee is controlled well except for when she had to get up for physical therapy.  She denies chest pain, shortness of breath, or abdominal pain.  She denies headache, dizziness, nausea/vomiting.       Objective     Last Recorded Vitals  /66 (BP Location: Right arm, Patient Position: Lying)   Pulse 73   Temp 36.7 °C (98.1 °F) (Oral)   Resp 16   Wt 106 kg (234 lb 9.1 oz)   SpO2 97%   Intake/Output last 3 Shifts:    Intake/Output Summary (Last 24 hours) at 6/25/2024 1040  Last data filed at 6/25/2024 0900  Gross per 24 hour   Intake 3042 ml   Output 1660 ml   Net 1382 ml       Admission Weight  Weight: 104 kg (229 lb 4.5 oz) (06/24/24 1222)    Daily Weight  06/25/24 : 106 kg (234 lb 9.1 oz)    Image Results  XR knee left 1-2 views  Narrative: Interpreted By:  Jb Martin,   STUDY:  XR KNEE LEFT 1-2 VIEWS; 6/24/2024 10:49 am      INDICATION:  Signs/Symptoms:Post-op knee.      COMPARISON:  None.      ACCESSION NUMBER(S):  [id number]      ORDERING CLINICIAN:  TAINA GUERRERO      TECHNIQUE:  2 views of the  left knee including AP and lateral projections were  obtained.      FINDINGS:  The patient is status post  left total knee arthroplasty. There is no  evidence of hardware failure, acute fracture or dislocation  identified.  Surgical drain is seen overlying the knee.      Impression: 1.  Postoperative changes status post  left total knee arthroplasty,  without evidence of hardware failure or acute fracture.      Signed by: Jb Martin 6/24/2024 12:19 PM  Dictation workstation:   [id number]      Physical Exam  Constitutional: No acute distress, calm, cooperative  HEENT: PERRL, normocephalic, atraumatic, mucous membranes moist  Cardiovascular: Regular rhythm and rate,   Respiratory: Lungs clear to  auscultation,   Gastrointestinal: Bowel sounds positive x 4, soft, nontender  Neurologic: Alert and oriented x 3 equal strength bilaterally  Musculoskeletal: Able to move all extremities, no edema, knee tender  Skin: Left knee dressing dry and intact  Relevant Results               Assessment/Plan      Left knee osteoarthritis  -Status post left total knee arthroplasty postop day 1  -Pain control  -Incentive spirometry  -Management per primary team     Hypertension  -Continue home amlodipine     Hyperlipidemia  -Continue home atorvastatin     DVT prophylaxis  -Per primary team     Cleared for discharge from a medical standpoint                 Principal Problem:    Arthritis of left knee  Active Problems:    HTN (hypertension)    Hyperlipidemia    Class 3 severe obesity in adult (Multi)    Seizures (Multi)                  Stephane Jacob, APRN-CNP

## 2024-06-26 ENCOUNTER — DOCUMENTATION (OUTPATIENT)
Dept: HOME HEALTH SERVICES | Facility: HOME HEALTH | Age: 74
End: 2024-06-26
Payer: MEDICARE

## 2024-06-26 ENCOUNTER — PHARMACY VISIT (OUTPATIENT)
Dept: PHARMACY | Facility: CLINIC | Age: 74
End: 2024-06-26
Payer: MEDICARE

## 2024-06-26 VITALS
HEART RATE: 83 BPM | BODY MASS INDEX: 44.29 KG/M2 | OXYGEN SATURATION: 99 % | DIASTOLIC BLOOD PRESSURE: 69 MMHG | TEMPERATURE: 97.7 F | RESPIRATION RATE: 16 BRPM | SYSTOLIC BLOOD PRESSURE: 142 MMHG | HEIGHT: 61 IN | WEIGHT: 234.57 LBS

## 2024-06-26 LAB
ANION GAP SERPL CALC-SCNC: 8 MMOL/L
BUN SERPL-MCNC: 32 MG/DL (ref 8–25)
CALCIUM SERPL-MCNC: 9 MG/DL (ref 8.5–10.4)
CHLORIDE SERPL-SCNC: 102 MMOL/L (ref 97–107)
CO2 SERPL-SCNC: 26 MMOL/L (ref 24–31)
CREAT SERPL-MCNC: 1.1 MG/DL (ref 0.4–1.6)
EGFRCR SERPLBLD CKD-EPI 2021: 53 ML/MIN/1.73M*2
ERYTHROCYTE [DISTWIDTH] IN BLOOD BY AUTOMATED COUNT: 12.3 % (ref 11.5–14.5)
GLUCOSE SERPL-MCNC: 109 MG/DL (ref 65–99)
HCT VFR BLD AUTO: 30.6 % (ref 36–46)
HGB BLD-MCNC: 10 G/DL (ref 12–16)
MCH RBC QN AUTO: 33.3 PG (ref 26–34)
MCHC RBC AUTO-ENTMCNC: 32.7 G/DL (ref 32–36)
MCV RBC AUTO: 102 FL (ref 80–100)
NRBC BLD-RTO: 0 /100 WBCS (ref 0–0)
PLATELET # BLD AUTO: 228 X10*3/UL (ref 150–450)
POTASSIUM SERPL-SCNC: 4.8 MMOL/L (ref 3.4–5.1)
RBC # BLD AUTO: 3 X10*6/UL (ref 4–5.2)
SODIUM SERPL-SCNC: 136 MMOL/L (ref 133–145)
WBC # BLD AUTO: 10.3 X10*3/UL (ref 4.4–11.3)

## 2024-06-26 PROCEDURE — 36415 COLL VENOUS BLD VENIPUNCTURE: CPT | Performed by: NURSE PRACTITIONER

## 2024-06-26 PROCEDURE — 97110 THERAPEUTIC EXERCISES: CPT | Mod: GP | Performed by: PHYSICAL THERAPIST

## 2024-06-26 PROCEDURE — 97530 THERAPEUTIC ACTIVITIES: CPT | Mod: GO

## 2024-06-26 PROCEDURE — 2500000004 HC RX 250 GENERAL PHARMACY W/ HCPCS (ALT 636 FOR OP/ED): Performed by: ORTHOPAEDIC SURGERY

## 2024-06-26 PROCEDURE — 82374 ASSAY BLOOD CARBON DIOXIDE: CPT | Performed by: NURSE PRACTITIONER

## 2024-06-26 PROCEDURE — 2500000001 HC RX 250 WO HCPCS SELF ADMINISTERED DRUGS (ALT 637 FOR MEDICARE OP): Performed by: INTERNAL MEDICINE

## 2024-06-26 PROCEDURE — 97530 THERAPEUTIC ACTIVITIES: CPT | Mod: GP | Performed by: PHYSICAL THERAPIST

## 2024-06-26 PROCEDURE — 7100000011 HC EXTENDED STAY RECOVERY HOURLY - NURSING UNIT

## 2024-06-26 PROCEDURE — 2500000001 HC RX 250 WO HCPCS SELF ADMINISTERED DRUGS (ALT 637 FOR MEDICARE OP): Performed by: ORTHOPAEDIC SURGERY

## 2024-06-26 PROCEDURE — RXMED WILLOW AMBULATORY MEDICATION CHARGE

## 2024-06-26 PROCEDURE — 97535 SELF CARE MNGMENT TRAINING: CPT | Mod: GO

## 2024-06-26 PROCEDURE — 85027 COMPLETE CBC AUTOMATED: CPT | Performed by: NURSE PRACTITIONER

## 2024-06-26 ASSESSMENT — PAIN DESCRIPTION - LOCATION
LOCATION: KNEE

## 2024-06-26 ASSESSMENT — COGNITIVE AND FUNCTIONAL STATUS - GENERAL
TURNING FROM BACK TO SIDE WHILE IN FLAT BAD: A LITTLE
MOBILITY SCORE: 18
WALKING IN HOSPITAL ROOM: A LITTLE
MOVING FROM LYING ON BACK TO SITTING ON SIDE OF FLAT BED WITH BEDRAILS: A LITTLE
DRESSING REGULAR UPPER BODY CLOTHING: A LITTLE
TOILETING: A LITTLE
MOVING TO AND FROM BED TO CHAIR: A LITTLE
DRESSING REGULAR LOWER BODY CLOTHING: A LOT
HELP NEEDED FOR BATHING: A LOT
DAILY ACTIVITIY SCORE: 17
CLIMB 3 TO 5 STEPS WITH RAILING: A LITTLE
STANDING UP FROM CHAIR USING ARMS: A LITTLE
PERSONAL GROOMING: A LITTLE

## 2024-06-26 ASSESSMENT — PAIN SCALES - GENERAL
PAINLEVEL_OUTOF10: 2
PAINLEVEL_OUTOF10: 1
PAINLEVEL_OUTOF10: 7
PAINLEVEL_OUTOF10: 7
PAINLEVEL_OUTOF10: 4
PAINLEVEL_OUTOF10: 6
PAINLEVEL_OUTOF10: 4

## 2024-06-26 ASSESSMENT — ACTIVITIES OF DAILY LIVING (ADL): HOME_MANAGEMENT_TIME_ENTRY: 15

## 2024-06-26 ASSESSMENT — PAIN - FUNCTIONAL ASSESSMENT
PAIN_FUNCTIONAL_ASSESSMENT: 0-10

## 2024-06-26 ASSESSMENT — PAIN DESCRIPTION - ORIENTATION
ORIENTATION: LEFT
ORIENTATION: LEFT

## 2024-06-26 NOTE — PROGRESS NOTES
Occupational Therapy    OT Treatment    Patient Name: Peggy Menjivar  MRN: [id number]  Today's Date: 6/26/2024  Time Calculation  Start Time: 0845  Stop Time: 0910  Time Calculation (min): 25 min        Assessment:  OT Assessment: Pt is reporting less pain today, which is more manageable for her, and the results are improvements with functional performance. Pt demonstrating good functional progress today towards established goals. Continue POC.  Evaluation/Treatment Tolerance: Patient tolerated treatment well  End of Session Communication: Bedside nurse  End of Session Patient Position: Up in chair, Alarm on (Needs in reach.)    Plan:  Treatment Interventions: ADL retraining, Functional transfer training, Endurance training, Patient/family training, Equipment evaluation/education, Neuromuscular reeducation, Compensatory technique education  OT Frequency: 5 times per week  OT Discharge Recommendations: Low intensity level of continued care  Equipment Recommended upon Discharge: Wheeled walker  OT Recommended Transfer Status: Assist of 1  OT - OK to Discharge: Yes      Subjective     Previous Visit Info:  OT Last Visit  OT Received On: 06/26/24    General:  General  Family/Caregiver Present: Yes  Caregiver Feedback: Spouse at bedside.  Prior to Session Communication: Bedside nurse  Patient Position Received: Bed, 3 rail up, Alarm off, not on at start of session  General Comment: Cleared by nurse prior to session and pt agreeable to OT treatment.    Precautions:  Hearing/Visual Limitations: wears glasses  LE Weight Bearing Status: Weight Bearing as Tolerated (LLE)  Medical Precautions: Fall precautions  Post-Surgical Precautions: Left total knee precautions    Pain:  Pain Assessment  Pain Assessment: 0-10  0-10 (Numeric) Pain Score: 6  Pain Type: Surgical pain, Acute pain  Pain Location: Knee  Pain Orientation: Left  Pain Interventions: Ambulation/increased activity, Other (Comment) (Nurse aware.)    Objective       Bed Mobility/Transfers: Bed Mobility  Bed Mobility: Yes  Bed Mobility 1  Bed Mobility 1: Supine to sitting  Level of Assistance 1: Distant supervision    Transfers  Transfer: Yes  Transfer 1  Transfer From 1: Bed to  Transfer to 1: Stand  Technique 1: Sit to stand  Transfer Device 1: Walker  Transfer Level of Assistance 1: Contact guard, Minimal verbal cues  Trials/Comments 1: Steadying assist for balance/safety.  Transfers 2  Transfer From 2: Stand to  Transfer to 2: Chair with arms  Technique 2: Stand to sit  Transfer Device 2: Walker  Transfer Level of Assistance 2: Contact guard, Minimal verbal cues  Trials/Comments 2: Steadying assist for balance/safety.    Toilet Transfers  Toilet Transfer From: Walker  Toilet Transfer Type: To and from  Toilet Transfer to: Standard toilet  Toilet Transfer Technique: Ambulating  Toilet Transfers: Contact guard, Supervision  Toilet Transfers Comments: CGA to the toilet for balance and close S off the toilet for safety  Tub Transfers  Tub Transfers Comments: Pt will be sponge bathing at home until HHOT addresses tub transfers with her at home.    Functional Mobility:  Functional Mobility  Functional Mobility Performed: Yes  Functional Mobility 1  Device 1: Rolling walker  Assistance 1: Contact guard, Minimal verbal cues  Comments 1: Pt tolerated functional mobility to/from the bathroom using a RW with CGA for balance/safety and verbal cues as needed.      Outcome Measures:[facility] Daily Activity  Putting on and taking off regular lower body clothing: A lot  Bathing (including washing, rinsing, drying): A lot  Putting on and taking off regular upper body clothing: A little  Toileting, which includes using toilet, bedpan or urinal: A little  Taking care of personal grooming such as brushing teeth: A little  Eating Meals: None  Daily Activity - Total Score: 17      Education Documentation  No documentation found.  Education Comments  No comments found.      Goals:  Encounter  Problems       Encounter Problems (Active)       OT Goals       Pt will complete all functional transfers and mobility with mod indep using a RW. (Progressing)       Start:  06/24/24    Expected End:  07/29/24            Pt will tolerate functional mobility for a household distance using a RW with mod indep. (Progressing)       Start:  06/24/24    Expected End:  07/29/24            Pt will complete all ADL's with mod indep/indep using a device as needed. (Progressing)       Start:  06/24/24    Expected End:  07/29/24

## 2024-06-26 NOTE — PROGRESS NOTES
Peggy Menjivar is a 73 y.o. female on day 0 of admission presenting with Arthritis of left knee.      Subjective   Patient examined sitting up in bed.  States that she has some pain in her right knee when she was ambulating but otherwise she is feeling pretty good.  She denies chest pain, shortness of breath, or abdominal pain.  She further denies any headache, dizziness, nausea/vomiting.       Objective     Last Recorded Vitals  /51 (BP Location: Right arm, Patient Position: Lying)   Pulse 80   Temp 37 °C (98.6 °F) (Temporal)   Resp 15   Wt 106 kg (234 lb 9.1 oz)   SpO2 97%   Intake/Output last 3 Shifts:    Intake/Output Summary (Last 24 hours) at 6/26/2024 0951  Last data filed at 6/25/2024 2100  Gross per 24 hour   Intake 720 ml   Output 50 ml   Net 670 ml       Admission Weight  Weight: 104 kg (229 lb 4.5 oz) (06/24/24 1222)    Daily Weight  06/25/24 : 106 kg (234 lb 9.1 oz)    Image Results  XR knee left 1-2 views  Narrative: Interpreted By:  Jb Martin,   STUDY:  XR KNEE LEFT 1-2 VIEWS; 6/24/2024 10:49 am      INDICATION:  Signs/Symptoms:Post-op knee.      COMPARISON:  None.      ACCESSION NUMBER(S):  [id number]      ORDERING CLINICIAN:  TAINA GUERRERO      TECHNIQUE:  2 views of the  left knee including AP and lateral projections were  obtained.      FINDINGS:  The patient is status post  left total knee arthroplasty. There is no  evidence of hardware failure, acute fracture or dislocation  identified.  Surgical drain is seen overlying the knee.      Impression: 1.  Postoperative changes status post  left total knee arthroplasty,  without evidence of hardware failure or acute fracture.      Signed by: Jb Martin 6/24/2024 12:19 PM  Dictation workstation:   [id number]      Physical Exam  Constitutional: No acute distress, calm, cooperative  HEENT: PERRL, normocephalic, atraumatic, mucous membranes moist  Cardiovascular: Regular rhythm and rate,   Respiratory: Lungs clear to auscultation,    Gastrointestinal: Bowel sounds positive x 4, soft, nontender  Neurologic: Alert and oriented x 3 equal strength bilaterally  Musculoskeletal: Able to move all extremities, no edema  Skin: Left knee dressing dry and intact  Relevant Results               Assessment/Plan      Left knee osteoarthritis  -Status post left total knee arthroplasty postop day 2  -Pain control  -Incentive spirometry  -Management per primary team     Hypertension  -Continue home amlodipine     Hyperlipidemia  -Continue home atorvastatin     DVT prophylaxis  -Per primary team     Cleared for discharge from a medical standpoint  Orthopedics plans to discharge today            Principal Problem:    Arthritis of left knee  Active Problems:    HTN (hypertension)    Hyperlipidemia    Class 3 severe obesity in adult (Multi)    Seizures (Multi)                  Stephane Jacob, APRN-CNP

## 2024-06-26 NOTE — CARE PLAN
The patient's goals for the shift include      The clinical goals for the shift include manage pain and rest    Over the shift, the patient did not make progress toward the following goals. Barriers to progression include pain. Recommendations to address these barriers include manage pain.

## 2024-06-26 NOTE — PROGRESS NOTES
"Peggy Menjivar is a 73 y.o. female on day 0 of admission presenting with Arthritis of left knee.    Subjective   In bed eating breakfast.  States she feels a little better today.  Still has pain with mobilization.  Feels like she can go home.  Denies chest pain shortness of breath.  Tolerating p.o. intake.       Objective     Physical Exam  Left lower extremity: Dressing dry.  Drain has been removed.  Compartments of the thigh and calf are soft.  Active ankle dorsiflexion plantarflexion intact.    Last Recorded Vitals  Blood pressure 124/51, pulse 80, temperature 37 °C (98.6 °F), temperature source Temporal, resp. rate 15, height 1.553 m (5' 1.14\"), weight 106 kg (234 lb 9.1 oz), SpO2 97%.      Relevant Results      Results for orders placed or performed during the hospital encounter of 06/24/24 (from the past 24 hour(s))   Basic Metabolic Panel   Result Value Ref Range    Glucose 109 (H) 65 - 99 mg/dL    Sodium 136 133 - 145 mmol/L    Potassium 4.8 3.4 - 5.1 mmol/L    Chloride 102 97 - 107 mmol/L    Bicarbonate 26 24 - 31 mmol/L    Urea Nitrogen 32 (H) 8 - 25 mg/dL    Creatinine 1.10 0.40 - 1.60 mg/dL    eGFR 53 (L) >60 mL/min/1.73m*2    Calcium 9.0 8.5 - 10.4 mg/dL    Anion Gap 8 <=19 mmol/L   CBC   Result Value Ref Range    WBC 10.3 4.4 - 11.3 x10*3/uL    nRBC 0.0 0.0 - 0.0 /100 WBCs    RBC 3.00 (L) 4.00 - 5.20 x10*6/uL    Hemoglobin 10.0 (L) 12.0 - 16.0 g/dL    Hematocrit 30.6 (L) 36.0 - 46.0 %     (H) 80 - 100 fL    MCH 33.3 26.0 - 34.0 pg    MCHC 32.7 32.0 - 36.0 g/dL    RDW 12.3 11.5 - 14.5 %    Platelets 228 150 - 450 x10*3/uL       Assessment/Plan   Principal Problem:    Arthritis of left knee  Active Problems:    HTN (hypertension)    Hyperlipidemia    Class 3 severe obesity in adult (Multi)    Seizures (Multi)      Problem List:  Left total knee replacement: Postoperative day 2.  PT OT.  Weightbearing as tolerated.  Plan for home with home health care.  DVT risk: Aspirin twice " daily        Joe Katz MD

## 2024-06-26 NOTE — PROGRESS NOTES
Pt anticipated to dc today, [facility] made [facility] aware.     06/26/24 8058   Discharge Planning   Patient expects to be discharged to: Home with [facility]

## 2024-06-26 NOTE — PROGRESS NOTES
Physical Therapy    Physical Therapy Treatment    Patient Name: Peggy Menjivar  MRN: [id number]  Today's Date: 6/26/2024  Time Calculation  Start Time: 1006  Stop Time: 1032  Time Calculation (min): 26 min    Assessment/Plan   PT Assessment  End of Session Communication: Bedside nurse  Assessment Comment: Pt made adeqaute progress toward her functional mobility goals. Pt would benefit from continued skilled PT services for maximizing independence and safety prior to & after discharge (low intensity).  End of Session Patient Position: Bed, 3 rail up, Alarm off, not on at start of session (call light and needs within reach; spouse in room)     PT Plan  Treatment/Interventions: Bed mobility, Transfer training, Gait training, Stair training, Balance training, Strengthening, Range of motion, Therapeutic exercise, Therapeutic activity  PT Plan: Ongoing PT  PT Frequency: BID  PT Discharge Recommendations: Low intensity level of continued care  PT Recommended Transfer Status: Assist x1 (FWW)  PT - OK to Discharge: Yes      General Visit Information:   PT  Visit  PT Received On: 06/26/24  General  Family/Caregiver Present: Yes  Caregiver Feedback: Spouse at bedside.  Prior to Session Communication: Bedside nurse  Patient Position Received: Bed, 3 rail up, Alarm off, not on at start of session  General Comment: Pt agreed to session and participated fully.    Subjective   Precautions:  LE Weight Bearing Status: Weight Bearing as Tolerated (LLE)  Medical Precautions: Fall precautions  Post-Surgical Precautions: Left total knee precautions  Precautions Comment: Pt stated 1/3 precautions prior to education.       Objective   Pain:  Pain Assessment: 0-10  0-10 (Numeric) Pain Score: 2  Pain Type: Surgical pain  Pain Location: Knee  Pain Orientation: Left  Pain Interventions: Ambulation/increased activity     Activity Tolerance:  Endurance: Tolerates 10 - 20 min exercise with multiple rests    Treatments:  Therapeutic  Exercise  Cued as needed for proper form to obtain maximal benefits; x20 each    -Supine       -bilat AP       -bilat QS        -L knee flex       -L SAQ     -Seated        -L LAQ        -L knee flex    Bed Mobility  Bed Mobility 1: Supine to sitting, Sitting to supine  Level of Assistance 1: Close supervision  Bed Mobility Comments 1: HOB elevated, toward L side    Transfers  Technique 1: Sit to stand, Stand to sit  Transfer Device 1: Walker  Transfer Level of Assistance 1: Close supervision  Trials/Comments 1: x2 trials at EOB or bedside chair    Ambulation/Gait Training  Surface 1: Level tile  Device 1: Rolling walker  Assistance 1: Close supervision  Comments/Distance (ft) 1: 200 ft using minimal step through pattern with slowed velocity. Steady gait.    Stairs  Rails 1: Left (ascending)  Assistance 1: Contact guard  Comment/Number of Steps 1: 10 stairs using step-to-step technique with seqencing to protect LLE. Slowed pacing and stedy throughout.    Outcome Measures:  [facility] Basic Mobility  Turning from your back to your side while in a flat bed without using bedrails: A little  Moving from lying on your back to sitting on the side of a flat bed without using bedrails: A little  Moving to and from bed to chair (including a wheelchair): A little  Standing up from a chair using your arms (e.g. wheelchair or bedside chair): A little  To walk in hospital room: A little  Climbing 3-5 steps with railing: A little  Basic Mobility - Total Score: 18    Education Documentation  Precautions, taught by Zaria Morse PT at 6/26/2024 11:47 AM.  Learner: Patient  Readiness: Acceptance  Method: Explanation  Response: Verbalizes Understanding, Demonstrated Understanding    Home Exercise Program, taught by Zaria Morse PT at 6/26/2024 11:47 AM.  Learner: Patient  Readiness: Acceptance  Method: Explanation  Response: Verbalizes Understanding, Demonstrated Understanding    Mobility Training, taught by Zaria Morse PT at  6/26/2024 11:47 AM.  Learner: Patient  Readiness: Acceptance  Method: Explanation  Response: Verbalizes Understanding, Demonstrated Understanding    Education Comments  No comments found.           Encounter Problems       Encounter Problems (Active)       Functional Mobility       Pt will transition supine<>sit with mod I. (Progressing)       Start:  06/24/24    Expected End:  07/21/24            Pt will transfer sit<>stand with FWW & mod I. (Progressing)       Start:  06/24/24    Expected End:  07/21/24            Pt will ambulate >/=150 ft with FWW & mod I. (Progressing)       Start:  06/24/24    Expected End:  07/21/24            Pt will negotiate 5 stairs with unilateral rail and mod I. (Progressing)       Start:  06/24/24    Expected End:  07/21/24               Pain - Adult

## 2024-06-26 NOTE — HH CARE COORDINATION
Home Care received a Referral for Physical Therapy. We have processed the referral for a Start of Care on 06/27.     If you have any questions or concerns, please feel free to contact us at [phone]. Follow the prompts, enter your five digit zip code, and you will be directed to your care team on E[facility] 1.

## 2024-06-27 ENCOUNTER — HOME CARE VISIT (OUTPATIENT)
Dept: HOME HEALTH SERVICES | Facility: HOME HEALTH | Age: 74
End: 2024-06-27
Payer: MEDICARE

## 2024-06-27 VITALS
TEMPERATURE: 98.2 F | RESPIRATION RATE: 16 BRPM | DIASTOLIC BLOOD PRESSURE: 80 MMHG | SYSTOLIC BLOOD PRESSURE: 144 MMHG | HEART RATE: 80 BPM

## 2024-06-27 PROCEDURE — G0151 HHCP-SERV OF PT,EA 15 MIN: HCPCS | Mod: HHH

## 2024-06-27 PROCEDURE — 169592 NO-PAY CLAIM PROCEDURE

## 2024-06-27 ASSESSMENT — ACTIVITIES OF DAILY LIVING (ADL)
AMBULATION ASSISTANCE ON FLAT SURFACES: 1
OASIS_M1830: 05
ENTERING_EXITING_HOME: STAND BY ASSIST

## 2024-06-27 ASSESSMENT — GAIT ASSESSMENTS
PATH: 1 - MILD/MODERATE DEVIATION OR USES WALKING AID
INITIATION OF GAIT IMMEDIATELY AFTER GO: 0 - ANY HESITANCY OR MULTIPLE ATTEMPTS TO START
GAIT SCORE: 4
PATH SCORE: 1
TRUNK SCORE: 0
WALKING STANCE: 0 - HEELS APART
TRUNK: 0 - MARKED SWAY OR USES WALKING AID
BALANCE AND GAIT SCORE: 13
STEP CONTINUITY: 0 - STOPPING OR DISCONTINUITY BETWEEN STEPS
STEP SYMMETRY: 1 - RIGHT AND LEFT STEP LENGTH APPEAR EQUAL

## 2024-06-27 ASSESSMENT — BALANCE ASSESSMENTS
NUDGED SCORE: 2
TURNING 360 DEGREES STEPS: 0 - DISCONTINUOUS STEPS
ATTEMPTS TO ARISE: 1 - ABLE, REQUIRES MORE THAN ONE ATTEMPT
BALANCE SCORE: 9
IMMEDIATE STANDING BALANCE FIRST 5 SECONDS: 1 - STEADY BUT USES WALKER OR OTHER SUPPORT
ARISES: 1 - ABLE, USES ARMS TO HELP
NUDGED: 2 - STEADY
ARISING SCORE: 1
STANDING BALANCE: 1 - STEADY BUT WIDE STANCE AND USES CANE OR OTHER SUPPORT
SITTING BALANCE: 1 - STEADY, SAFE
EYES CLOSED AT MAXIMUM POSITION NUDGED: 1 - STEADY
SITTING DOWN: 1 - USES ARMS OR NOT SMOOTH MOTION

## 2024-06-27 ASSESSMENT — ENCOUNTER SYMPTOMS
SUBJECTIVE PAIN PROGRESSION: UNCHANGED
PERSON REPORTING PAIN: PATIENT
PAIN: 1
PAIN SEVERITY GOAL: 0/10
PAIN LOCATION: LEFT KNEE
PAIN LOCATION - PAIN SEVERITY: 2/10
HIGHEST PAIN SEVERITY IN PAST 24 HOURS: 10/10
LOWEST PAIN SEVERITY IN PAST 24 HOURS: 2/10

## 2024-06-27 NOTE — Clinical Note
Spoke with patient and clarified with her what her appointment is for on 11/5/2020. EMB. Understanding verbalized. Start of care completed for home health PT today. Pt is doing well with mobility and her ROM is at 75 degrees flexion and lacking 7 degrees extension. Dressing intact with no seepage. We will see her twice weekly for 2 weeks, and then she is scheduled to begin outpatient PT on 7/15.  I do not see wound care orders in the referral, do you want us to remove the Mepilex POD7, some other time, or do you want to remove it?

## 2024-06-27 NOTE — HOME HEALTH
S: This pt was referred to home health PT following L TKA. She is demonstrating increased difficulty with mobility as a result.    B: Pt lives with her  in a multistory house with 5 stairs to exit the building. Her bedroom is on the main floor without stairs to navigate, but she is currently sleeping on the couch due to difficulty getting into her bed. Pt is using a wheeled walker with SBA for all ambulation but was independent with ambulation without use of assistive device prior to current episode of care. Pt reports no recent falls and rates current pain level at 2/10. Medications reconciled in the home     A: Pt presents with decreased L knee ROM at 75 degrees flexion and lacking 7 degrees extension, L LE weakness affecting bed mobility, transfers, gait / stairs and balance, as well as gait and balance impairment as illustrated by Tinetti score of 13/28. Pt ambulates using wheeled walker with SBA demonstrating shortened strides, inconsistent rhythm and instability. Pt instructed to increase L hip and knee flexion during swing through phase. Pt educated in signs and symptoms of DVT and infection and instructed to seek immediate medical attention should any arise. Reviewed HEP issued from hospital and instructed in proper technique and frequency. Instructed pt to ambulate every 1-2 hours. Dressing intact with no seepage or signs of infection noted.    R: Pt will benefit from skilled PT for LE strengthening to facilitate bed mobility, transfers, gait / stairs and balance, as well as transfer, gait and balance training to improve functional mobility and independence.

## 2024-06-28 ENCOUNTER — HOME CARE VISIT (OUTPATIENT)
Dept: HOME HEALTH SERVICES | Facility: HOME HEALTH | Age: 74
End: 2024-06-28
Payer: MEDICARE

## 2024-07-01 ENCOUNTER — HOME CARE VISIT (OUTPATIENT)
Dept: HOME HEALTH SERVICES | Facility: HOME HEALTH | Age: 74
End: 2024-07-01
Payer: MEDICARE

## 2024-07-01 VITALS
HEART RATE: 87 BPM | SYSTOLIC BLOOD PRESSURE: 140 MMHG | RESPIRATION RATE: 16 BRPM | DIASTOLIC BLOOD PRESSURE: 84 MMHG | TEMPERATURE: 98.7 F | OXYGEN SATURATION: 96 %

## 2024-07-01 PROCEDURE — G0151 HHCP-SERV OF PT,EA 15 MIN: HCPCS | Mod: HHH

## 2024-07-01 ASSESSMENT — ENCOUNTER SYMPTOMS
HIGHEST PAIN SEVERITY IN PAST 24 HOURS: 8/10
LOWEST PAIN SEVERITY IN PAST 24 HOURS: 3/10
SUBJECTIVE PAIN PROGRESSION: WAXING AND WANING
PERSON REPORTING PAIN: PATIENT
PAIN: 1

## 2024-07-05 ENCOUNTER — HOME CARE VISIT (OUTPATIENT)
Dept: HOME HEALTH SERVICES | Facility: HOME HEALTH | Age: 74
End: 2024-07-05
Payer: MEDICARE

## 2024-07-05 VITALS
HEART RATE: 82 BPM | TEMPERATURE: 98.9 F | OXYGEN SATURATION: 98 % | RESPIRATION RATE: 16 BRPM | SYSTOLIC BLOOD PRESSURE: 154 MMHG | DIASTOLIC BLOOD PRESSURE: 82 MMHG

## 2024-07-05 SDOH — HEALTH STABILITY: PHYSICAL HEALTH
EXERCISE COMMENTS: PT INSTRUCTED PATIENT IN EXERCISES PER TKA PROTOCOL 1X15 AND UPGRADED HEP TO INCLUDE 3 NEW STANDING EXERCISES:  - ANKLE PUMPS  - GLUT SETS  - QUAD SETS  - HEELSLIDES  - HIP ABDUCTION  - SAQ  - LAQ  - SLR  - SEATED KNEE FLEXION STRETCH  - STANDING KNE

## 2024-07-05 SDOH — HEALTH STABILITY: PHYSICAL HEALTH: EXERCISE COMMENTS: E EXTENSION  - STANDING KNEE FLEXION  - STANDING PF/DF    PATIENT TOLERATED SESSION WELL.

## 2024-07-05 ASSESSMENT — ENCOUNTER SYMPTOMS
PERSON REPORTING PAIN: PATIENT
PAIN: 1

## 2024-07-09 ENCOUNTER — HOME CARE VISIT (OUTPATIENT)
Dept: HOME HEALTH SERVICES | Facility: HOME HEALTH | Age: 74
End: 2024-07-09
Payer: MEDICARE

## 2024-07-09 VITALS
TEMPERATURE: 99.1 F | SYSTOLIC BLOOD PRESSURE: 156 MMHG | HEART RATE: 97 BPM | OXYGEN SATURATION: 99 % | DIASTOLIC BLOOD PRESSURE: 76 MMHG | RESPIRATION RATE: 16 BRPM

## 2024-07-09 PROCEDURE — G0151 HHCP-SERV OF PT,EA 15 MIN: HCPCS | Mod: HHH

## 2024-07-09 SDOH — HEALTH STABILITY: PHYSICAL HEALTH
EXERCISE COMMENTS: PT INSTRUCTED PATIENT IN EXERCISES PER TKA PROTOCOL 1X10:  - ANKLE PUMPS  - GLUT SETS  - QUAD SETS  - SAQ  - LAQ  - SLR  - STANDING KNEE EXTENSION  - STANDING L HIP FLEXION  - STANDING PF/DF    PT EDUCATED PATIENT TO AVOID KNEE FLEXION ROM EXERCISES

## 2024-07-09 SDOH — HEALTH STABILITY: PHYSICAL HEALTH: EXERCISE COMMENTS: IN ORDER TO PREVENT DRAINAGE PER MD ORDERS.

## 2024-07-09 ASSESSMENT — ENCOUNTER SYMPTOMS
NAUSEA: 1
LOWEST PAIN SEVERITY IN PAST 24 HOURS: 0/10
HIGHEST PAIN SEVERITY IN PAST 24 HOURS: 6/10
PERSON REPORTING PAIN: PATIENT
PAIN: 1
SUBJECTIVE PAIN PROGRESSION: WAXING AND WANING

## 2024-07-11 ENCOUNTER — HOME CARE VISIT (OUTPATIENT)
Dept: HOME HEALTH SERVICES | Facility: HOME HEALTH | Age: 74
End: 2024-07-11
Payer: MEDICARE

## 2024-07-11 VITALS
SYSTOLIC BLOOD PRESSURE: 156 MMHG | HEART RATE: 75 BPM | DIASTOLIC BLOOD PRESSURE: 80 MMHG | RESPIRATION RATE: 16 BRPM | TEMPERATURE: 99 F | OXYGEN SATURATION: 99 %

## 2024-07-11 PROCEDURE — G0151 HHCP-SERV OF PT,EA 15 MIN: HCPCS | Mod: HHH

## 2024-07-11 SDOH — HEALTH STABILITY: PHYSICAL HEALTH: EXERCISE COMMENTS: FLEXION  - STANDING PF/DF

## 2024-07-11 SDOH — HEALTH STABILITY: PHYSICAL HEALTH
EXERCISE COMMENTS: PT INSTRUCTED PATIENT IN EXERCISES PER TKA PROTOCOL 1X20:  - ANKLE PUMPS  - GLUT SETS  - QUAD SETS  - HIP ABDUCTION  - SAQ  - LAQ  - SLR  - SEATED KNEE EXTENSION STRETCH  - STANDING KNEE EXTENSION  - STANDING KNEE FLEXION  - STANDING ALTERNATING HIP

## 2024-07-11 ASSESSMENT — ENCOUNTER SYMPTOMS
HIGHEST PAIN SEVERITY IN PAST 24 HOURS: 2/10
PERSON REPORTING PAIN: PATIENT
LOWEST PAIN SEVERITY IN PAST 24 HOURS: 0/10
PAIN: 1

## 2024-07-11 ASSESSMENT — ACTIVITIES OF DAILY LIVING (ADL)
HOME_HEALTH_OASIS: 00
AMBULATION ASSISTANCE ON FLAT SURFACES: 1
OASIS_M1830: 03

## 2024-07-15 ENCOUNTER — EVALUATION (OUTPATIENT)
Dept: PHYSICAL THERAPY | Facility: CLINIC | Age: 74
End: 2024-07-15
Payer: MEDICARE

## 2024-07-15 DIAGNOSIS — Z96.652 STATUS POST TOTAL LEFT KNEE REPLACEMENT: ICD-10-CM

## 2024-07-15 DIAGNOSIS — M25.562 ACUTE PAIN OF LEFT KNEE: Primary | ICD-10-CM

## 2024-07-15 DIAGNOSIS — M17.12 UNILATERAL PRIMARY OSTEOARTHRITIS, LEFT KNEE: ICD-10-CM

## 2024-07-15 PROCEDURE — 97161 PT EVAL LOW COMPLEX 20 MIN: CPT | Mod: GP | Performed by: PHYSICAL THERAPIST

## 2024-07-15 ASSESSMENT — PAIN SCALES - GENERAL: PAINLEVEL_OUTOF10: 0 - NO PAIN

## 2024-07-15 ASSESSMENT — PAIN - FUNCTIONAL ASSESSMENT: PAIN_FUNCTIONAL_ASSESSMENT: 0-10

## 2024-07-15 NOTE — PROGRESS NOTES
Physical Therapy Evaluation and Treatment      Patient Name: Peggy Menjivar  MRN: [id number]  Today's Date: 7/15/2024  Time Calculation  Start Time: 1015  Stop Time: 1055  Time Calculation (min): 40 min  Low complexity due to patient's clinical presentation being stable and uncomplicated by any significant comorbidities that may affect rehab tolerance and progression.    Insurance:  Visit number: eval of 8  Authorization info: EVAL ONLY, THEN AUTH THRU CARELON / ANTH VOC / $30 COPAY  Insurance Type: Payor: [employer] MEDICARE / Plan: [employer] MEDICARE ADVANTAGE / Product Type: *No Product type* /     Current Problem:   1. Acute pain of left knee  Follow Up In Physical Therapy      2. Unilateral primary osteoarthritis, left knee  Referral to Physical Therapy    Follow Up In Physical Therapy      3. Status post total left knee replacement  Follow Up In Physical Therapy          Subjective    General:  General  Reason for Referral: L TKA  Referred By: Dr. Katz  General Comment: Pt comes in s/p L TKA. Been off of prescripton pain meds since last Tuesday. Reports that incision kept seeping drainage that did not stop. HHPT contacted MD about drainage and was told to put bandages on. Last MD visit, pt was instructed to wash every day to get adhesive glue off due to having an allergic reaction to it. MD also put patient on a ROM restriction to help reduce drainage. Drainage has stopped. Pt was instructed to come to PT today and see how things go.  Precautions:  Precautions  Post-Surgical Precautions: Left total knee precautions  Precautions Comment: L TKA 6/24/24 - Allergy to adhesive  Pain:  Pain Assessment  Pain Assessment: 0-10  0-10 (Numeric) Pain Score: 0 - No pain  Home Living:   lives in 2 story home with a basement., lives with  to assist, stairs at home with 1 railing   Prior Level of Function:  Prior Function Per Pt/Caregiver Report  Level of Dora: Independent with ADLs and functional  transfers  Receives Help From: Family    Objective   General Assessments:  Strength Comments: sit to stand- good quad contraction  Functional Assessments:  Stairs Comment: reciprocal pattern with 1 handrail assist during ascend; non-reciprocal pattern with 1 handrail assist descend   and Transfers Comment: able to sit <> stand without hand    KNEE    Observation  Observation Comment: incision site still present due to allergic reaction to adhesive    Knee AROM  L knee flexion: (140°): 103  L knee extension: (0°): 5    Knee MMT  Knee MMT WFL: no (functionally assessed- slight deficit during stair negotiation; good quad contraction during TKE)  L knee flexion: (5/5): 4+  L knee extension: (5/5): 4+    Gait  Gait Comment: overall normalized gait with use of cane    Outcome Measures:  Other Measures  Lower Extremity Funtional Score (LEFS): 43     Treatments:  Therapeutic Exercise  Therapeutic Exercise Performed: Yes  Therapeutic Exercise Activity 1: Access Code [email or web address]    Therapeutic Activity  Therapeutic Activity Performed: Yes  Therapeutic Activity 1: Educated on expected outcome in regards to therapy  Therapeutic Activity 2: Educated on ROM restrictions in relation to incision healing  Therapeutic Activity 3: Educated on updated HEP in regards to HHPT exercises      Assessment   Assessment:  PT Assessment Results: Decreased strength, Decreased range of motion, Pain  Rehab Prognosis: Good  Assessment Comment: Pt is a 73 y.o female presenting to the clinic s/p L TKA 6/24/24. Pt demonstrated ROM and strength deficits that are normal with post-op status. Incision site is still healing due to an allergic reaction to adhesive so knee flexion ROM was restricted this date. Overall, pt presents with normal ROM and strength deficits post-op with reduced incision healing. Pt would benefit from skilled physical therapy to improve ROM, improve strength, and prevent further functional decline.    Plan:  Treatment/Interventions:  Cryotherapy, Dry needling, Education/ Instruction, Gait training, Manual therapy, Neuromuscular re-education, Therapeutic activities, Therapeutic exercises  PT Plan: Skilled PT  PT Frequency: 1 time per week  Duration: 8 weeks + eval  Onset Date: 06/24/24  Number of Treatments Authorized: Auth Required  Rehab Potential: Good  Plan of Care Agreement: Patient    OP EDUCATION:  Outpatient Education  Individual(s) Educated: Patient  Education Provided: Anatomy, Home Exercise Program, POC, Post-Op Precautions  Risk and Benefits Discussed with Patient/Caregiver/Other: yes  Patient/Caregiver Demonstrated Understanding: yes  Plan of Care Discussed and Agreed Upon: yes  Patient Response to Education: Patient/Caregiver Verbalized Understanding of Information, Patient/Caregiver Performed Return Demonstration of Exercises/Activities, Patient/Caregiver Asked Appropriate Questions    Goals:  Active       PT Problem       PT Goal 1       Start:  07/15/24    Expected End:  08/19/24       Pt will be 50% IND with HEP in 4 weeks in order to progress with therapy.           PT Goal 2       Start:  07/15/24    Expected End:  08/19/24       Pt will demonstrate subjective improvement of ADLs and recreational activities through improved score of 55 on LEFS in 4 weeks.           PT Goal 3       Start:  07/15/24    Expected End:  08/19/24       Pt will improve L Knee mobility as follows flexion- 120, extension- 0 in 4 weeks to improve transfers, ambulation and stair negotiation           PT Goal 4       Start:  07/15/24    Expected End:  08/19/24       Pt will improve L knee strength to 5/5 in 4 weeks in order to improve transfers, ambulation and stair negotiation            PT Goal 5       Start:  07/15/24    Expected End:  08/19/24       Pt will reduce pain levels to no more than 2/10 in 4 weeks in order to improve daily tasks.              PT Problem       PT Goal 1       Start:  07/15/24    Expected End:  09/18/24       Pt will be 100%  IND with HEP in 8 weeks in order to maintain progress with therapy.            PT Goal 2       Start:  07/15/24    Expected End:  09/18/24       Pt will demonstrate subjective improvement of ADLs and recreational activities through improved score of 70 on LEFS in 8 weeks.           PT Goal 4       Start:  07/15/24    Expected End:  09/18/24       Pt will be able to perform ascending/descending reciprocal pattern of 12 steps in 8 weeks for household and community ambulation.           PT Goal 5       Start:  07/15/24    Expected End:  09/18/24       Pt will be able to perform community ambulation demonstrating normalized gait pattern without assistive device in 8 weeks for community ambulation.

## 2024-07-22 ENCOUNTER — TREATMENT (OUTPATIENT)
Dept: PHYSICAL THERAPY | Facility: CLINIC | Age: 74
End: 2024-07-22
Payer: MEDICARE

## 2024-07-22 DIAGNOSIS — M17.12 UNILATERAL PRIMARY OSTEOARTHRITIS, LEFT KNEE: ICD-10-CM

## 2024-07-22 DIAGNOSIS — Z96.652 STATUS POST TOTAL LEFT KNEE REPLACEMENT: ICD-10-CM

## 2024-07-22 DIAGNOSIS — M25.562 ACUTE PAIN OF LEFT KNEE: ICD-10-CM

## 2024-07-22 PROCEDURE — 97110 THERAPEUTIC EXERCISES: CPT | Mod: GP,CQ

## 2024-07-22 NOTE — PROGRESS NOTES
Physical Therapy Treatment    Patient Name: Peggy Menjivar  MRN: [id number]  Today's Date: 7/22/2024  Time Calculation  Start Time: 0840  Stop Time: 0920  Time Calculation (min): 40 min  PT Therapeutic Procedures Time Entry  Therapeutic Exercise Time Entry: 40    Insurance:  Visit number: 2 of 8  Authorization info: 13 visits 7/22-10/20 CPTs [id number]    EVAL ONLY, THEN AUTH THRU CARELON / ANTH VOC / $30 COPAY / OOP $5500 - $431 met / AVAILITY [id number] / ds 7/11/24.  Insurance Type: Payor: [employer] MEDICARE / Plan: [employer] MEDICARE ADVANTAGE / Product Type: *No Product type* /   13 visits 7/22-10/20 CPTs [id number]    EVAL ONLY, THEN AUTH THRU CARELON / ANTH VOC / $30 COPAY / OOP $5500 - $431 met / AVAILITY [id number] / ds 7/11/24.  Current Problem   1. Unilateral primary osteoarthritis, left knee  Follow Up In Physical Therapy      2. Acute pain of left knee  Follow Up In Physical Therapy      3. Status post total left knee replacement  Follow Up In Physical Therapy          Subjective   General    Pt still feeling nauseous at times.  Precautions:   L TKR  Pain    uncomfortable  Post Treatment Pain Level 0    Objective   L knee AROM 2-115    Treatments:  Therapeutic Exercise:   Nu-step x 7'  Step-ups 2 x 10  Calf raises off step x 20  STS on 1 pad 2 x 15  Side stepping with yellow loop 4 x 20'  Knee flexion  stretch on step  SLR 2 x 15  Bridges 2 x 10  Seated HS curls with GTB          Assessment   Assessment:    Pt tolerated session fairly well. Good quad contraction with STS. Improved AROM with L knee flexion.    Plan:    Progress overall functional strength.    OP EDUCATION:   Revised HEP to discontinue TKE's.    Goals:   Active       PT Problem       PT Goal 1 (Progressing)       Start:  07/15/24    Expected End:  08/19/24       Pt will be 50% IND with HEP in 4 weeks in order to progress with therapy.           PT Goal 2 (Progressing)       Start:  07/15/24    Expected  End:  08/19/24       Pt will demonstrate subjective improvement of ADLs and recreational activities through improved score of 55 on LEFS in 4 weeks.           PT Goal 3 (Progressing)       Start:  07/15/24    Expected End:  08/19/24       Pt will improve L Knee mobility as follows flexion- 120, extension- 0 in 4 weeks to improve transfers, ambulation and stair negotiation           PT Goal 4 (Progressing)       Start:  07/15/24    Expected End:  08/19/24       Pt will improve L knee strength to 5/5 in 4 weeks in order to improve transfers, ambulation and stair negotiation            PT Goal 5 (Progressing)       Start:  07/15/24    Expected End:  08/19/24       Pt will reduce pain levels to no more than 2/10 in 4 weeks in order to improve daily tasks.              PT Problem       PT Goal 1 (Progressing)       Start:  07/15/24    Expected End:  09/18/24       Pt will be 100% IND with HEP in 8 weeks in order to maintain progress with therapy.            PT Goal 2 (Progressing)       Start:  07/15/24    Expected End:  09/18/24       Pt will demonstrate subjective improvement of ADLs and recreational activities through improved score of 70 on LEFS in 8 weeks.           PT Goal 4 (Progressing)       Start:  07/15/24    Expected End:  09/18/24       Pt will be able to perform ascending/descending reciprocal pattern of 12 steps in 8 weeks for household and community ambulation.           PT Goal 5 (Progressing)       Start:  07/15/24    Expected End:  09/18/24       Pt will be able to perform community ambulation demonstrating normalized gait pattern without assistive device in 8 weeks for community ambulation.

## 2024-07-30 ENCOUNTER — TREATMENT (OUTPATIENT)
Dept: PHYSICAL THERAPY | Facility: CLINIC | Age: 74
End: 2024-07-30
Payer: MEDICARE

## 2024-07-30 DIAGNOSIS — M17.12 UNILATERAL PRIMARY OSTEOARTHRITIS, LEFT KNEE: ICD-10-CM

## 2024-07-30 DIAGNOSIS — M25.562 ACUTE PAIN OF LEFT KNEE: ICD-10-CM

## 2024-07-30 DIAGNOSIS — Z96.652 STATUS POST TOTAL LEFT KNEE REPLACEMENT: ICD-10-CM

## 2024-07-30 PROCEDURE — 97110 THERAPEUTIC EXERCISES: CPT | Mod: GP,CQ

## 2024-07-30 NOTE — PROGRESS NOTES
"Physical Therapy Treatment    Patient Name: Peggy Menjivar  MRN: [id number]  Today's Date: 7/30/2024  Time Calculation  Start Time: 1235  Stop Time: 1305  Time Calculation (min): 30 min  PT Therapeutic Procedures Time Entry  Therapeutic Exercise Time Entry: 30    Insurance:  Visit number: 3 of 8  Authorization info: 13 visits 7/22-10/20 CPTs [id number]    EVAL ONLY, THEN AUTH THRU CARELON / ANTH VOC / $30 COPAY / OOP $5500 - $431 met / AVAILITY [id number] / ds 7/11/24.  Insurance Type: Payor: [employer] MEDICARE / Plan: [employer] MEDICARE ADVANTAGE / Product Type: *No Product type* /     Current Problem   1. Unilateral primary osteoarthritis, left knee  Follow Up In Physical Therapy      2. Acute pain of left knee  Follow Up In Physical Therapy      3. Status post total left knee replacement  Follow Up In Physical Therapy          Subjective   General    Pt reports no pain during the day just at night when she is less active.  Precautions:   L TKR 6/24/24  Pain    0  Post Treatment Pain Level 0    Objective   L knee AROM: 0-121    Treatments:  Therapeutic Exercise:   Nu-step x 7'  Step-ups 6\"  2 x 10  Calf raises off step x 20  STS on 1 pad 1 x 15; 2 x 10 no pad  Side stepping with yellow loop 4 x 20' at ankles  Knee flexion  stretch on step  SLR 2 x 15  Hip ABD 2 x 15 GTB  Clam shells 2 x 15 GTB  Bridges 2 x 15 GTB  Seated HS curls with GTB      Assessment   Assessment:    Pt is progressing well per protocol. Good AOM++ROM and quad/glute  strength continues to improve.    Plan:   Progress per POC     OP EDUCATION:   Added to HEP    Goals:   Active       PT Problem       PT Goal 1 (Progressing)       Start:  07/15/24    Expected End:  08/19/24       Pt will be 50% IND with HEP in 4 weeks in order to progress with therapy.           PT Goal 2 (Progressing)       Start:  07/15/24    Expected End:  08/19/24       Pt will demonstrate subjective improvement of ADLs and recreational activities through " improved score of 55 on LEFS in 4 weeks.           PT Goal 3 (Progressing)       Start:  07/15/24    Expected End:  08/19/24       Pt will improve L Knee mobility as follows flexion- 120, extension- 0 in 4 weeks to improve transfers, ambulation and stair negotiation           PT Goal 4 (Progressing)       Start:  07/15/24    Expected End:  08/19/24       Pt will improve L knee strength to 5/5 in 4 weeks in order to improve transfers, ambulation and stair negotiation            PT Goal 5 (Progressing)       Start:  07/15/24    Expected End:  08/19/24       Pt will reduce pain levels to no more than 2/10 in 4 weeks in order to improve daily tasks.              PT Problem       PT Goal 1 (Progressing)       Start:  07/15/24    Expected End:  09/18/24       Pt will be 100% IND with HEP in 8 weeks in order to maintain progress with therapy.            PT Goal 2 (Progressing)       Start:  07/15/24    Expected End:  09/18/24       Pt will demonstrate subjective improvement of ADLs and recreational activities through improved score of 70 on LEFS in 8 weeks.           PT Goal 4 (Progressing)       Start:  07/15/24    Expected End:  09/18/24       Pt will be able to perform ascending/descending reciprocal pattern of 12 steps in 8 weeks for household and community ambulation.           PT Goal 5 (Progressing)       Start:  07/15/24    Expected End:  09/18/24       Pt will be able to perform community ambulation demonstrating normalized gait pattern without assistive device in 8 weeks for community ambulation.            Active

## 2024-08-05 ENCOUNTER — TREATMENT (OUTPATIENT)
Dept: PHYSICAL THERAPY | Facility: CLINIC | Age: 74
End: 2024-08-05
Payer: MEDICARE

## 2024-08-05 DIAGNOSIS — M25.562 ACUTE PAIN OF LEFT KNEE: ICD-10-CM

## 2024-08-05 DIAGNOSIS — Z96.652 STATUS POST TOTAL LEFT KNEE REPLACEMENT: ICD-10-CM

## 2024-08-05 DIAGNOSIS — M17.12 UNILATERAL PRIMARY OSTEOARTHRITIS, LEFT KNEE: ICD-10-CM

## 2024-08-05 PROCEDURE — 97110 THERAPEUTIC EXERCISES: CPT | Mod: GP,CQ

## 2024-08-05 NOTE — PROGRESS NOTES
"Physical Therapy Treatment    Patient Name: Peggy Menjivar  MRN: [id number]  Today's Date: 8/5/2024  Time Calculation  Start Time: 1005  Stop Time: 1045  Time Calculation (min): 40 min  PT Therapeutic Procedures Time Entry  Therapeutic Exercise Time Entry: 40    Insurance:  Visit number: 4 of 8  Authorization info: 13 visits 7/22-10/20 CPTs [id number]    EVAL ONLY, THEN AUTH THRU CARELON / ANTH VOC / $30 COPAY / OOP $5500 - $431 met / AVAILITY [id number] / ds 7/11/24.  Insurance Type: Payor: [employer] MEDICARE / Plan: [employer] MEDICARE ADVANTAGE / Product Type: *No Product type* /     Current Problem   1. Unilateral primary osteoarthritis, left knee  Follow Up In Physical Therapy      2. Acute pain of left knee  Follow Up In Physical Therapy      3. Status post total left knee replacement  Follow Up In Physical Therapy          Subjective   General    Pt reports that she had a couple bouts of a shooting pain in L knee that traveled to her ankle.    Precautions:     Pain    2  Post Treatment Pain Level same    Objective   Antalgic gait pattern L.   L knee AROM 0-122    Treatments:  Therapeutic Exercise:   Nu-step x 7'  Step-ups 6\"  2 x 10  Calf raises off step x 20  STS  3 x 10 no pad  Side stepping with yellow loop 4 x 20' at ankles  Knee flexion  stretch on step  SLR 2 x 15  Hip ABD 2 x 15 BTB  Clam shells 2 x 15 BTB  Bridges 2 x 15 BTB  Seated HS curls with BTB 2 x 15      Assessment   Assessment:   Pt is progressing very well per protocol/healing phase.     Plan:    Continue per POC    OP EDUCATION:   Upgraded there ex with harder resistances     Goals:   Active       PT Problem       PT Goal 1 (Progressing)       Start:  07/15/24    Expected End:  08/19/24       Pt will be 50% IND with HEP in 4 weeks in order to progress with therapy.           PT Goal 2 (Progressing)       Start:  07/15/24    Expected End:  08/19/24       Pt will demonstrate subjective improvement of ADLs and recreational " activities through improved score of 55 on LEFS in 4 weeks.           PT Goal 3 (Progressing)       Start:  07/15/24    Expected End:  08/19/24       Pt will improve L Knee mobility as follows flexion- 120, extension- 0 in 4 weeks to improve transfers, ambulation and stair negotiation           PT Goal 4 (Progressing)       Start:  07/15/24    Expected End:  08/19/24       Pt will improve L knee strength to 5/5 in 4 weeks in order to improve transfers, ambulation and stair negotiation            PT Goal 5 (Progressing)       Start:  07/15/24    Expected End:  08/19/24       Pt will reduce pain levels to no more than 2/10 in 4 weeks in order to improve daily tasks.              PT Problem       PT Goal 1 (Progressing)       Start:  07/15/24    Expected End:  09/18/24       Pt will be 100% IND with HEP in 8 weeks in order to maintain progress with therapy.            PT Goal 2 (Progressing)       Start:  07/15/24    Expected End:  09/18/24       Pt will demonstrate subjective improvement of ADLs and recreational activities through improved score of 70 on LEFS in 8 weeks.           PT Goal 4 (Progressing)       Start:  07/15/24    Expected End:  09/18/24       Pt will be able to perform ascending/descending reciprocal pattern of 12 steps in 8 weeks for household and community ambulation.           PT Goal 5 (Progressing)       Start:  07/15/24    Expected End:  09/18/24       Pt will be able to perform community ambulation demonstrating normalized gait pattern without assistive device in 8 weeks for community ambulation.

## 2024-08-12 ENCOUNTER — TREATMENT (OUTPATIENT)
Dept: PHYSICAL THERAPY | Facility: CLINIC | Age: 74
End: 2024-08-12
Payer: MEDICARE

## 2024-08-12 DIAGNOSIS — M17.12 UNILATERAL PRIMARY OSTEOARTHRITIS, LEFT KNEE: ICD-10-CM

## 2024-08-12 DIAGNOSIS — M25.562 ACUTE PAIN OF LEFT KNEE: ICD-10-CM

## 2024-08-12 DIAGNOSIS — Z96.652 STATUS POST TOTAL LEFT KNEE REPLACEMENT: ICD-10-CM

## 2024-08-12 PROCEDURE — 97110 THERAPEUTIC EXERCISES: CPT | Mod: GP,CQ

## 2024-08-12 NOTE — PROGRESS NOTES
Physical Therapy Treatment    Patient Name: Peggy Menjivar  MRN: [id number]  Today's Date: 8/12/2024  Time Calculation  Start Time: 1005  Stop Time: 1045  Time Calculation (min): 40 min  PT Therapeutic Procedures Time Entry  Therapeutic Exercise Time Entry: 40    Insurance:  Visit number: 5 of 8  Authorization info: 13 visits 7/22-10/20 CPTs [id number] [id number]    EVAL ONLY, THEN AUTH THRU CARELON / ANTH VOC / $30 COPAY / OOP $5500 - $431 met / AVAILITY [id number] / ds 7/11/24.  Insurance Type: Payor: [employer] MEDICARE / Plan: [employer] MEDICARE ADVANTAGE / Product Type: *No Product type* /     Current Problem   1. Unilateral primary osteoarthritis, left knee  Follow Up In Physical Therapy      2. Acute pain of left knee  Follow Up In Physical Therapy      3. Status post total left knee replacement  Follow Up In Physical Therapy          Subjective   General    Pt reports more discomfort in L knee this past week. Better today.  Precautions:   L TKR  Pain    2  Post Treatment Pain Level less    Objective   L knee AROM in supine:0-120    Treatments:  Therapeutic Exercise:  Nu-step x 5'  TKE standing x 15  Calf raises off step x 20  HS curls with BTB x 25  LAQ 2 x 15 #5  STS  3 x 10   Side stepping with yellow loop 4 x 20' at ankles  Knee flexion  stretch on step  SLR 2 x 10 #2  Hip ABD 2 x 15 BTB  Clam shells 2 x 15 BTB  Bridges 2 x 15 BTB        Assessment   Assessment:    Pt tolerated session well. No pain with today's session.    Plan:    Progress with LE strengthening as able.    OP EDUCATION:   Updated HEP    Goals:   Active       PT Problem       PT Goal 1 (Progressing)       Start:  07/15/24    Expected End:  08/19/24       Pt will be 50% IND with HEP in 4 weeks in order to progress with therapy.           PT Goal 2 (Progressing)       Start:  07/15/24    Expected End:  08/19/24       Pt will demonstrate subjective improvement of ADLs and recreational activities through improved score of 55 on LEFS in 4  weeks.           PT Goal 3 (Progressing)       Start:  07/15/24    Expected End:  08/19/24       Pt will improve L Knee mobility as follows flexion- 120, extension- 0 in 4 weeks to improve transfers, ambulation and stair negotiation           PT Goal 4 (Progressing)       Start:  07/15/24    Expected End:  08/19/24       Pt will improve L knee strength to 5/5 in 4 weeks in order to improve transfers, ambulation and stair negotiation            PT Goal 5 (Progressing)       Start:  07/15/24    Expected End:  08/19/24       Pt will reduce pain levels to no more than 2/10 in 4 weeks in order to improve daily tasks.              PT Problem       PT Goal 1 (Progressing)       Start:  07/15/24    Expected End:  09/18/24       Pt will be 100% IND with HEP in 8 weeks in order to maintain progress with therapy.            PT Goal 2 (Progressing)       Start:  07/15/24    Expected End:  09/18/24       Pt will demonstrate subjective improvement of ADLs and recreational activities through improved score of 70 on LEFS in 8 weeks.           PT Goal 4 (Progressing)       Start:  07/15/24    Expected End:  09/18/24       Pt will be able to perform ascending/descending reciprocal pattern of 12 steps in 8 weeks for household and community ambulation.           PT Goal 5 (Progressing)       Start:  07/15/24    Expected End:  09/18/24       Pt will be able to perform community ambulation demonstrating normalized gait pattern without assistive device in 8 weeks for community ambulation.             Active

## 2024-08-19 ENCOUNTER — APPOINTMENT (OUTPATIENT)
Dept: PHYSICAL THERAPY | Facility: CLINIC | Age: 74
End: 2024-08-19
Payer: MEDICARE

## 2024-08-26 ENCOUNTER — TREATMENT (OUTPATIENT)
Dept: PHYSICAL THERAPY | Facility: CLINIC | Age: 74
End: 2024-08-26
Payer: MEDICARE

## 2024-08-26 DIAGNOSIS — M17.12 UNILATERAL PRIMARY OSTEOARTHRITIS, LEFT KNEE: ICD-10-CM

## 2024-08-26 DIAGNOSIS — Z96.652 STATUS POST TOTAL LEFT KNEE REPLACEMENT: ICD-10-CM

## 2024-08-26 DIAGNOSIS — M25.562 ACUTE PAIN OF LEFT KNEE: ICD-10-CM

## 2024-08-26 PROCEDURE — 97161 PT EVAL LOW COMPLEX 20 MIN: CPT | Mod: GP

## 2024-08-26 NOTE — PROGRESS NOTES
Physical Therapy Treatment    Patient Name: Peggy Menjivar  MRN: [id number]  Today's Date: 8/26/2024  Time Calculation  Start Time: 1015  Stop Time: 1045  Time Calculation (min): 30 min  PT Therapeutic Procedures Time Entry  Therapeutic Exercise Time Entry: 30    Insurance:  Visit number: 6 of 8  Authorization info: 13 visits 7/22-10/20 CPTs [id number]    EVAL ONLY, THEN AUTH THRU CARELON / ANTH VOC / $30 COPAY / OOP $5500 - $431 met / AVAILITY [id number] / ds 7/11/24.  Insurance Type: Payor: [employer] MEDICARE / Plan: [employer] MEDICARE ADVANTAGE / Product Type: *No Product type* /     Current Problem   1. Unilateral primary osteoarthritis, left knee  Follow Up In Physical Therapy      2. Acute pain of left knee  Follow Up In Physical Therapy      3. Status post total left knee replacement  Follow Up In Physical Therapy          Subjective   General    Pt reports no significant pain or functional limitations at this time   Precautions:   L TKR  Pain    2  Post Treatment Pain Level less    Objective   L knee AROM in supine:0-120  Gait:WFL  Stairs; WFL  Transfers: WFL      Treatments:  Therapeutic Exercise:  Nu-step x 5'  Reassessment  Review HEP      Assessment   Assessment:    Pt presents to the clinic today for a formal reassessment. Pt is demonstrating significant improvement with respect to strength, AROM, and tolerance to activity. Pt has no pain or functional limitations and this time and has successfully met all of her therapeutic goals. Pt will be D.C from PT at this time.      Plan:   DC PT     OP EDUCATION:   Updated HEP    Goals:   Active       PT Problem       PT Goal 1 (Progressing)       Start:  07/15/24    Expected End:  08/19/24       Pt will be 50% IND with HEP in 4 weeks in order to progress with therapy.           PT Goal 2 (Progressing)       Start:  07/15/24    Expected End:  08/19/24       Pt will demonstrate subjective improvement of ADLs and recreational activities through  improved score of 55 on LEFS in 4 weeks.           PT Goal 3 (Progressing)       Start:  07/15/24    Expected End:  08/19/24       Pt will improve L Knee mobility as follows flexion- 120, extension- 0 in 4 weeks to improve transfers, ambulation and stair negotiation           PT Goal 4 (Progressing)       Start:  07/15/24    Expected End:  08/19/24       Pt will improve L knee strength to 5/5 in 4 weeks in order to improve transfers, ambulation and stair negotiation            PT Goal 5 (Progressing)       Start:  07/15/24    Expected End:  08/19/24       Pt will reduce pain levels to no more than 2/10 in 4 weeks in order to improve daily tasks.              PT Problem       PT Goal 1 (Progressing)       Start:  07/15/24    Expected End:  09/18/24       Pt will be 100% IND with HEP in 8 weeks in order to maintain progress with therapy.            PT Goal 2 (Progressing)       Start:  07/15/24    Expected End:  09/18/24       Pt will demonstrate subjective improvement of ADLs and recreational activities through improved score of 70 on LEFS in 8 weeks.           PT Goal 4 (Progressing)       Start:  07/15/24    Expected End:  09/18/24       Pt will be able to perform ascending/descending reciprocal pattern of 12 steps in 8 weeks for household and community ambulation.           PT Goal 5 (Progressing)       Start:  07/15/24    Expected End:  09/18/24       Pt will be able to perform community ambulation demonstrating normalized gait pattern without assistive device in 8 weeks for community ambulation.             Active

## 2024-08-29 ENCOUNTER — HOSPITAL ENCOUNTER (OUTPATIENT)
Dept: RADIOLOGY | Facility: HOSPITAL | Age: 74
Discharge: HOME | End: 2024-08-29
Payer: MEDICARE

## 2024-08-29 DIAGNOSIS — K57.92 DIVERTICULITIS OF INTESTINE, PART UNSPECIFIED, WITHOUT PERFORATION OR ABSCESS WITHOUT BLEEDING: ICD-10-CM

## 2024-08-29 PROCEDURE — 74176 CT ABD & PELVIS W/O CONTRAST: CPT

## 2024-09-03 ENCOUNTER — APPOINTMENT (OUTPATIENT)
Dept: PHYSICAL THERAPY | Facility: CLINIC | Age: 74
End: 2024-09-03
Payer: MEDICARE

## 2024-09-05 NOTE — PROGRESS NOTES
Peggy Gutierres is a 73 year old female referred by Dr. Oscar Santizo for evaluation of diverticulitis.    2014 first attack of diverticulitis.  She believes that she has had about 1-2 mild attacks yearly.  Can years that she does not have an attack.  Had her knee replaced in June.  Became very constipated after surgery and then had a diverticulitis attack.  Was treated with Cipro x 7 days.   Three days later she was given another 10 days.   Then she had some relief of symptoms.  She notes that she still has mild pain in the LLQ only if she pushes on the LLQ.  She is able to eat and drink normally.  Bowel habit is once daily and soft formed stools.  Does not spend more than 5 minutes on the toilet.  She is a [employer] and will not accept blood products.  Not on a fiber supplement.  She did have a colonoscopy within the past five years in [place]. Unsure of the doctor's name      1/23/2014 CT ABD/PELVIS WO CONTRAST  1. Mild distal descending and sigmoid diverticulosis with findings compatible with acute diverticulitis involving the mid sigmoid colon. There is a small packets of fluid collection adjacent to the inferior wall of the sigmoid colon which measures up to 3.3 x 1.8 cm. This demonstrates no discrete enhancing rim and well could represent a loculation of reactive inflammatory fluid. A developing abscess cannot be excluded. Followup imaging may consider for reevaluation as clinically warranted. No  intraperitoneal free air.  2. Contracted gallbladder with cholelithiasis.  3. Indeterminant left adrenal apex nodule up to 14 mm.  4. A subtle punctate nonobstructive calculus suspected in the right kidney lower pole.    2/17/2014 CT ABD/PELVIS WITH CONTRAST  -Diverticulosis with resolution of previously seen findings of diverticulitis.  -Gallstones.  -Small nodule involving left adrenal gland unchanged.       8/30/2024 CT ABD/PELVIS WO CONTRAST  1. Equivocal fat stranding surrounding several diverticula in  the sigmoid colon likely representing early versus resolving diverticulitis.  2. Otherwise, no acute process in the abdomen or pelvis.      Past Medical History  Left breast cancer  HTN  HLD  Osteoarthritis  Seizure  Spinal Stenosis    Surgical History  Breast surgery, left mastectomy with reconstruction  Laparoscopic Cholecystectomy 2023  Hysterectomy  Right knee surgery      Social History  Smoking:   ETOH:    Family History    Review of Systems  Constitutional: Negative for fever, chills, anorexia, weight loss, malaise     ENMT: Negative for nasal discharge, congestion, ear pain, mouth pain, throat pain     Respiratory: Negative for cough, hemoptysis, wheezing, shortness of breath     Cardiac: Negative for chest pain, dyspnea on exertion, orthopnea, palpitations, syncope, (+)HTN, (+)HLD    Gastrointestinal: Negative for nausea, vomiting, diarrhea, constipation, abdominal pain, (+)DIVERTICULITIS    Genitourinary: Negative for discharge, dysuria, flank pain, frequency, hematuria     Musculoskeletal: Negative for decreased ROM, pain, swelling, weakness, (+)SPINAL STENOSIS     Neurological: Negative for dizziness, confusion, headache, seizures, syncope, (+)SEIZURES     Psychiatric: Negative for mood changes, anxiety, hallucinations, sleep changes, suicidal ideas     Skin: Negative for mass, pain, itching, rash, ulcer     Endocrine: Negative for heat intolerance, cold intolerance, excessive sweating, polyuria, excess thirst     Hematologic/Lymph: Negative for anemia, bruising, easy bleeding, night sweats, petechiae, history of DVT/PE or cancer , (+)HX OF BREAST CANCER    Allergic/Immunologic: Negative for anaphylaxis, itchy/ teary eyes, itching, sneezing, swelling    Physical Exam  Constitutional: Well developed, awake/alert/oriented x3, no distress, alert and cooperative             Eyes: Sclera anicteric, no conjunctival inflammation, conjugate gaze    ENMT: mucous membranes moist, no apparent injury,             Head/Neck: Neck supple, no apparent injury, No JVD, trachea midline, no bruits              Respiratory/Thorax: Patent airways, CTAB, normal breath sounds with good chest expansion, thorax symmetric         Cardiovascular: Regular, rate and rhythm, no murmurs, normal S1 and S2         Gastrointestinal: Well-healed laparoscopic incisions.  Well-healed transverse incision from prior breast flap nondistended, soft, non-tender, no rebound tenderness or guarding, no masses palpable, no organomegaly, +BS, no bruits               Extremities: normal extremities, no cyanosis edema, contusions or wounds, 2+ femoral pulses B/L              Neurological: alert and oriented x3, normal strength, Normal gait          Lymphatic: No palpable inguinal lymphadenopathy   Psychological: Appropriate mood and behavior         Skin: Warm and dry, no lesions, no rashes                Anorectal:      Impression:  Diverticulitis    Plan:    Will obtain prior endoscopy report  Will review laparoscopic cholecystectomy operative report which was completed after patient's flap  Daily fiber supplement to avoid flares  Bentyl as needed for cramping  She is a [employer], adverse to surgery if possible  Plan for watchful waiting, discussed elective sigmoid colectomy should symptoms persist or exacerbate in the future  Recommended weight loss if possible

## 2024-09-09 ENCOUNTER — APPOINTMENT (OUTPATIENT)
Dept: RADIOLOGY | Facility: HOSPITAL | Age: 74
End: 2024-09-09
Payer: MEDICARE

## 2024-09-10 ENCOUNTER — APPOINTMENT (OUTPATIENT)
Dept: PHYSICAL THERAPY | Facility: CLINIC | Age: 74
End: 2024-09-10
Payer: MEDICARE

## 2024-09-10 ENCOUNTER — OFFICE VISIT (OUTPATIENT)
Dept: SURGERY | Facility: CLINIC | Age: 74
End: 2024-09-10
Payer: MEDICARE

## 2024-09-10 VITALS
WEIGHT: 211 LBS | BODY MASS INDEX: 39.68 KG/M2 | SYSTOLIC BLOOD PRESSURE: 166 MMHG | OXYGEN SATURATION: 99 % | HEART RATE: 88 BPM | DIASTOLIC BLOOD PRESSURE: 106 MMHG

## 2024-09-10 DIAGNOSIS — E66.01 CLASS 3 SEVERE OBESITY DUE TO EXCESS CALORIES IN ADULT, UNSPECIFIED BMI, UNSPECIFIED WHETHER SERIOUS COMORBIDITY PRESENT (MULTI): Primary | ICD-10-CM

## 2024-09-10 DIAGNOSIS — R10.9 ABDOMINAL CRAMPING: ICD-10-CM

## 2024-09-10 DIAGNOSIS — K57.92 DIVERTICULITIS: ICD-10-CM

## 2024-09-10 PROCEDURE — 1126F AMNT PAIN NOTED NONE PRSNT: CPT | Performed by: SURGERY

## 2024-09-10 PROCEDURE — 1159F MED LIST DOCD IN RCRD: CPT | Performed by: SURGERY

## 2024-09-10 PROCEDURE — 99204 OFFICE O/P NEW MOD 45 MIN: CPT | Performed by: SURGERY

## 2024-09-10 PROCEDURE — 1157F ADVNC CARE PLAN IN RCRD: CPT | Performed by: SURGERY

## 2024-09-10 PROCEDURE — 99214 OFFICE O/P EST MOD 30 MIN: CPT | Performed by: SURGERY

## 2024-09-10 PROCEDURE — 3077F SYST BP >= 140 MM HG: CPT | Performed by: SURGERY

## 2024-09-10 PROCEDURE — 3080F DIAST BP >= 90 MM HG: CPT | Performed by: SURGERY

## 2024-09-10 RX ORDER — DICYCLOMINE HYDROCHLORIDE 20 MG/1
20 TABLET ORAL 4 TIMES DAILY PRN
Qty: 120 TABLET | Refills: 1 | Status: SHIPPED | OUTPATIENT
Start: 2024-09-10 | End: 2025-09-10

## 2024-09-10 ASSESSMENT — PAIN SCALES - GENERAL: PAINLEVEL: 0-NO PAIN

## 2024-10-07 ENCOUNTER — DOCUMENTATION (OUTPATIENT)
Dept: PHYSICAL THERAPY | Facility: CLINIC | Age: 74
End: 2024-10-07
Payer: MEDICARE

## 2024-10-07 NOTE — PROGRESS NOTES
Physical Therapy    Discharge Summary    Name: Peggy Menjivar  MRN: [id number]  : 1950  Date: 10/07/24    Discharge Summary: PT    Discharge Information: Date of discharge 10/7/24, Date of last visit 24, and Date of evaluation 7/15/24    Therapy Summary: Pt was discharged at last therapy session where treating PT stated pt demonstrated functional ROM and strength with appropriate findings for discharge.    Discharge Status: goals met     Rehab Discharge Reason: Achieved all and/or the most significant goals(s)

## 2025-06-27 ENCOUNTER — TELEPHONE (OUTPATIENT)
Facility: CLINIC | Age: 75
End: 2025-06-27
Payer: MEDICARE

## 2025-06-27 NOTE — TELEPHONE ENCOUNTER
Unable to leave vm informing patient that the 7/9/25 appt needed moved to 9/18/25 at 9:45 due to provider schedule change. Choisrt message sent.

## 2025-07-09 ENCOUNTER — APPOINTMENT (OUTPATIENT)
Facility: CLINIC | Age: 75
End: 2025-07-09
Payer: MEDICARE

## 2025-09-18 ENCOUNTER — APPOINTMENT (OUTPATIENT)
Facility: CLINIC | Age: 75
End: 2025-09-18
Payer: MEDICARE

## (undated) DEVICE — SUTURE, VICRYL, 2-0, 36 IN, CT-1, UNDYED

## (undated) DEVICE — SOLUTION, IRRIGATION, X RX SODIUM CHL 0.9%, 1000ML BTL

## (undated) DEVICE — BLADE, SAW, SAGITTAL, 18.0 X 1.27 X 90MM

## (undated) DEVICE — TIP, SUCTION, YANKAUER, FLEXIBLE

## (undated) DEVICE — IRRIGATION SYSTEM, WOUND, SURGIPHOR, 450ML, STERILE

## (undated) DEVICE — STOCKINETTE, TUBULAR, BIAS CUT, 1 PLY, 4 IN X 50 YD, COTTON, NS

## (undated) DEVICE — FACE SHIELD, OPTI-COM

## (undated) DEVICE — DRESSING, MEPILEX BORDER, POST-OP AG, 4 X 12 IN

## (undated) DEVICE — CATHETER TRAY, URETHRAL, FOLEY, 16 FR, SILICONE

## (undated) DEVICE — SUTURE, ETHIBOND, P2, V-37, 30 IN, GREEN

## (undated) DEVICE — TIP, SUCTION, FRAZIER, W/CONTROL VENT, 12 FR

## (undated) DEVICE — CUFF, TOURNIQUET, 34 X 4, DUAL PORT/SNGL BLADDER, DISP, LF

## (undated) DEVICE — Device

## (undated) DEVICE — RESERVOIR, WOUND, W/TROCAR, PVC, MEDIUM, 400CC, DAVOL, 1/8 IN, 10FR

## (undated) DEVICE — SUTURE, MONOCRYL, 4-0, 27 IN, PS-2, UNDYED

## (undated) DEVICE — HANDPIECE, INTERPULSE, W/RETRACTABLE COAX FAN, STERILE

## (undated) DEVICE — ADHESIVE, SKIN, DERMABOND ADVANCED, 15CM, PEN-STYLE

## (undated) DEVICE — GLOVE, SURGICAL, PROTEXIS PI BLUE W/NEUTHERA, 8.0, PF, LF

## (undated) DEVICE — CEMENT, MIXEVAC III, 10S BOWL, KNEES

## (undated) DEVICE — DRESSING, WOUND, PRIMAPORE, 6 X 3 1/8

## (undated) DEVICE — SOLUTION, IRRIGATION, USP, SODIUM CHLORIDE 0.9%, 3000 ML, BAG

## (undated) DEVICE — MARKER, SURGICAL, SKIN, REG TIP, W/ RULER & LABELS

## (undated) DEVICE — GLOVE, PROTEXIS PI CLASSIC, SZ-7.5, PF, LF

## (undated) DEVICE — SUTURE, VICRYL, 0, 36 IN, CT-1, UNDYED